# Patient Record
Sex: FEMALE | Race: WHITE | NOT HISPANIC OR LATINO | Employment: OTHER | URBAN - METROPOLITAN AREA
[De-identification: names, ages, dates, MRNs, and addresses within clinical notes are randomized per-mention and may not be internally consistent; named-entity substitution may affect disease eponyms.]

---

## 2023-09-06 ENCOUNTER — OFFICE VISIT (OUTPATIENT)
Dept: UROLOGY | Facility: CLINIC | Age: 83
End: 2023-09-06
Payer: MEDICARE

## 2023-09-06 VITALS
HEART RATE: 65 BPM | SYSTOLIC BLOOD PRESSURE: 124 MMHG | DIASTOLIC BLOOD PRESSURE: 74 MMHG | WEIGHT: 150 LBS | BODY MASS INDEX: 27.6 KG/M2 | HEIGHT: 62 IN | OXYGEN SATURATION: 98 %

## 2023-09-06 DIAGNOSIS — N39.41 URGENCY INCONTINENCE: Primary | ICD-10-CM

## 2023-09-06 RX ORDER — ALLOPURINOL 100 MG/1
TABLET ORAL
COMMUNITY

## 2023-09-06 RX ORDER — RAMIPRIL 5 MG/1
CAPSULE ORAL
COMMUNITY

## 2023-09-06 RX ORDER — CLOBETASOL PROPIONATE 0.5 MG/G
CREAM TOPICAL
COMMUNITY
Start: 2023-08-09

## 2023-09-06 RX ORDER — CONJUGATED ESTROGENS 0.62 MG/G
CREAM VAGINAL
COMMUNITY
Start: 2023-06-19

## 2023-09-06 RX ORDER — THIAMINE HCL 100 MG
TABLET ORAL
COMMUNITY

## 2023-09-06 RX ORDER — TIZANIDINE 2 MG/1
TABLET ORAL
COMMUNITY
Start: 2023-08-09

## 2023-09-06 RX ORDER — APIXABAN 2.5 MG/1
TABLET, FILM COATED ORAL
COMMUNITY

## 2023-09-06 RX ORDER — METHENAMINE HIPPURATE 1 G/1
TABLET ORAL
COMMUNITY

## 2023-09-06 RX ORDER — ONDANSETRON 4 MG/1
TABLET, FILM COATED ORAL
COMMUNITY
Start: 2023-03-14

## 2023-09-06 RX ORDER — BUDESONIDE AND FORMOTEROL FUMARATE DIHYDRATE 80; 4.5 UG/1; UG/1
2 AEROSOL RESPIRATORY (INHALATION)
COMMUNITY
Start: 2023-06-27

## 2023-09-06 RX ORDER — ROSUVASTATIN CALCIUM 10 MG/1
TABLET, COATED ORAL
COMMUNITY

## 2023-09-06 RX ORDER — FAMOTIDINE 20 MG/1
TABLET, FILM COATED ORAL
COMMUNITY
Start: 2023-08-15

## 2023-09-06 RX ORDER — BECLOMETHASONE DIPROPIONATE HFA 80 UG/1
AEROSOL, METERED RESPIRATORY (INHALATION)
COMMUNITY
Start: 2023-05-12

## 2023-09-06 RX ORDER — ISOSORBIDE MONONITRATE 60 MG/1
TABLET, EXTENDED RELEASE ORAL
COMMUNITY

## 2023-09-06 RX ORDER — NITROGLYCERIN 0.4 MG/1
TABLET SUBLINGUAL
COMMUNITY

## 2023-09-06 RX ORDER — LEVOTHYROXINE SODIUM 88 UG/1
TABLET ORAL
COMMUNITY
Start: 2023-07-24

## 2023-09-06 RX ORDER — LEVOTHYROXINE SODIUM 0.07 MG/1
TABLET ORAL
COMMUNITY
Start: 2023-08-11

## 2023-09-06 RX ORDER — FLUOXETINE HYDROCHLORIDE 20 MG/1
CAPSULE ORAL
COMMUNITY
Start: 2023-07-24

## 2023-09-06 RX ORDER — MONTELUKAST SODIUM 10 MG/1
TABLET ORAL
COMMUNITY

## 2023-09-06 NOTE — PATIENT INSTRUCTIONS
Educational Material for Overactive Bladder (OAB)     Overactive bladder is a term that may describe numerous urinary complaints, including urinary urgency, frequency, waking to urinate at night (nocturia), urgency incontinence (leaking of urine if unable to hold bladder), etc.     Overactive bladder symptoms may be more prevalent after menopause related to loss of estrogen and its effects on bladder and urethral integrity.     Conservative Management (Non-Medication Treatment) Options    1) Double voiding (after urinating, stay in bathroom, wait a few minutes, attempt to urinate again), to ensure complete bladder emptying  2) Timed voiding (urinate every 2-3 hours during the day) to prevent urgency related to a full or filling bladder  3) Avoid caffeine, alcohol, dark colten, teas, sugary soft drinks and other potentially bladder irritating beverages   4) Drink plenty of water (at least 5-7 glasses daily), to ensure your urine is dilute and clear (concentrated urine can increase bothersome urinary urgency and frequency as well as bladder pain)     5) Pelvic Floor Physical Therapy - a referral to a pelvic floor physical therapist may be recommended to evaluate and treat potential dysfunction of your pelvic floor (laxity or high pelvic tone) which can exacerbate urinary symptoms      Medical Treatment    Vaginal Estrogen Therapy - Your urologist may have prescribed vaginal estrogen cream, this is typically described to women who are postmenopausal.  After menopause a decline in estrogen can affect the urethra and bladder tissues.  pH imbalances can result in the loss of normal bacteria in the vagina and around the urethra, increasing risk of infections and urinary symptoms.  Restoration of normal blood flow with estrogen treatment and correction of pH imbalances may help to relieve bladder overactivity symptoms.    Anticholinergic Therapy - There are numerous medications within the anticholinergic class which helped  to treat overactive bladder symptoms.  A short list of this medications include solifenacin, tolterodine, trospium, oxybutynin, etc.  These medications work by acting on the junction between bladder nerves and the bladder muscle.  These medications help to relax the bladder muscle tone and prevent sensation of urgency and frequency.  These medications may help you store your urine better.  Common side effects of these medications include dry eye, dry mouth, constipation, possible vision changes, rare but possible mental status changes in the elderly.  If you have been prescribed these medications, monitor for these symptoms.    Beta 3 Agonist Therapy - Medications in this class include Mirabegron (Myrbetriq) and Vibegron (Gemtesa).  These medications are typically prescribed if patients do not tolerate anticholinergics or cannot be prescribed anticholinergics.  These medications are typically more expensive than anticholinergics, but insurance companies may cover all or part of the cost depending on your individual plan.  These medications are just as effective as anticholinergics and act on a different nerve pathway in the bladder and do not have the typical side effects of dry eye, dry mouth, or constipation.  Mirabegron may potentially cause an increase in blood pressure in less than 10% of patients, which will need to be closely monitored during therapy.    Surgical Treatments    Bladder Botox Injections - For patients who fail conservative management or medical therapy, injection of Botox into the bladder muscle is an option for treatment.  This is an outpatient procedure which can usually be done under a very light anesthetic.  Botox injections into the bladder causes a slight paralysis of the bladder muscle and long-term bladder relaxation for resolution of overactive bladder symptoms without need for medication.  The effects of Botox injections may last anywhere from 3 to 12 months depending on the  individual.  There is a slight risk of urinary retention or unable to urinate after procedure requiring the possible need to learn how to self catheterize to empty the bladder, this only occurs in less than 3-5% of patients typically.  Risks of injections include bleeding, infection and urinary retention as described above.  These are typically very well-tolerated.  One of the downsides to this approach is that patients may need repeat outpatient procedures over the long-term for continued management.    Sacral Neuromodulation - Sacral neuromodulation describes a surgical procedure whereby a small electronic nerve stimulator device is placed underneath the skin and fat of the buttocks, connected to an electrical lead placed along the S3 (sacral nerve root #3) which is the nerve that controls bladder function.  It has been shown that low-dose electrical stimulation of the bladder nerve can help to prevent overactive bladder symptoms including urgency, frequency and urgency related incontinence, without the need for taking oral medications.  This also benefits men and women who have fecal incontinence or incontinence of stool.  There are 2 companies, [employer]) and [employer] who make these devices.  Patients have control over their own device programming and stimulation levels with a small hand-held device.  This is an outpatient procedure but usually requires 2 separate procedures to complete placement.  The benefits of this approach includes long-term symptom control without the need for multiple procedures.  The implantable generator/nerve stimulator contains within it a battery which can be recharged with an externally worn device without the need for surgical intervention for battery replacement long-term.

## 2023-09-06 NOTE — PROGRESS NOTES
"     [facility] Female Office Visit      Patient Name: Karin Mchugh  : 1940   MRN: [id number]     Chief Complaint:  Lower Urinary Tract Symptoms.   Chief Complaint   Patient presents with    Urinary Incontinence       Referring Provider: Skye Loya MD    History of Present Illness: Mr. Mchugh is a 82 y.o. female with history lower urinary tract symptoms. She has severe overactive bladder with urgency incontinence. Has previously followed with urology in [place], then transitioned care to  Urology when she moved here in December.     She reports \"\" pad use due to constant sense of urgency incontinence.   She has a history of C diff following with ID.   She wears 8-10 pads per day. She leaks during sleep.     She has had a MI and s/p coronary stent, stage III CKD per outside records, prior PE, on Eliquis, history of atrial fibrillation s/p ablation.     Bowel and bladder symptom score is severe at 26/28.     She has been offered SNS, PTNS, and has undergone prior intravesical botox for UUI. She had to self catheterize after botox remotely. She reports no desire to attempt Botox again due to side effects.     Patient's cardiologist is at .       Subjective      Review of System: Review of Systems   Constitutional:  Positive for fatigue.   Respiratory:  Positive for shortness of breath.    Genitourinary:  Positive for frequency and urgency.   Musculoskeletal:  Positive for back pain, gait problem and joint swelling.   Neurological:  Positive for dizziness and light-headedness.   Psychiatric/Behavioral:  The patient is nervous/anxious.     I have reviewed the ROS documented by my clinical staff, I have updated appropriately and I agree. Boone Rasheed MD    Past Medical History:  No past medical history on file.    Past Surgical History:  No past surgical history on file.    Medications:    Current Outpatient Medications:     allopurinol (ZYLOPRIM) 100 MG tablet, ALLOPURINOL 100 MG TABS, Disp: , " Rfl:     budesonide-formoterol (SYMBICORT) 80-4.5 MCG/ACT inhaler, Inhale 2 puffs., Disp: , Rfl:     Calcium Citrate-Vitamin D3 (CITRACAL) 315-6.25 MG-MCG tablet tablet, EQ CALCIUM CITRATE+D 315-6.25 MG-MCG TABS, Disp: , Rfl:     clobetasol (TEMOVATE) 0.05 % cream, , Disp: , Rfl:     Eliquis 2.5 MG tablet tablet, ELIQUIS 2.5 MG TABS, Disp: , Rfl:     famotidine (PEPCID) 20 MG tablet, , Disp: , Rfl:     FLUoxetine (PROzac) 20 MG capsule, , Disp: , Rfl:     Hiprex 1 g tablet, HIPREX 1 GM TABS, Disp: , Rfl:     isosorbide mononitrate (IMDUR) 60 MG 24 hr tablet, ISOSORBIDE MONONITRATE ER 60 MG XR24H-TAB, Disp: , Rfl:     levothyroxine (SYNTHROID, LEVOTHROID) 75 MCG tablet, , Disp: , Rfl:     levothyroxine (SYNTHROID, LEVOTHROID) 88 MCG tablet, , Disp: , Rfl:     montelukast (SINGULAIR) 10 MG tablet, MONTELUKAST SODIUM 10 MG TABS, Disp: , Rfl:     mupirocin (BACTROBAN) 2 % ointment, , Disp: , Rfl:     nitroglycerin (NITROSTAT) 0.4 MG SL tablet, NITROGLYCERIN 0.4 MG SUBL, Disp: , Rfl:     ondansetron (ZOFRAN) 4 MG tablet, if needed., Disp: , Rfl:     Premarin 0.625 MG/GM vaginal cream, , Disp: , Rfl:     Qvar RediHaler 80 MCG/ACT inhaler, , Disp: , Rfl:     ramipril (ALTACE) 5 MG capsule, RAMIPRIL 5 MG CAPS, Disp: , Rfl:     rosuvastatin (CRESTOR) 10 MG tablet, ROSUVASTATIN CALCIUM 10 MG TABS, Disp: , Rfl:     tiZANidine (ZANAFLEX) 2 MG tablet, , Disp: , Rfl:     Allergies:  Allergies   Allergen Reactions    Sulfa Antibiotics Rash       Social History:  Social History     Socioeconomic History    Marital status:    Tobacco Use    Smoking status: Former     Types: Cigarettes   Vaping Use    Vaping Use: Never used   Substance and Sexual Activity    Alcohol use: Not Currently    Drug use: Never    Sexual activity: Not Currently       Family History:  No family history on file.           Objective     Physical Exam:   Vital Signs:   Vitals:    09/06/23 0835   BP: 124/74   Pulse: 65   SpO2: 98%   Weight: 68 kg (150 lb)  "  Height: 157.5 cm (62\")     Body mass index is 27.44 kg/m².     Physical Exam  Constitutional:       Appearance: Normal appearance.   HENT:      Head: Normocephalic and atraumatic.      Nose: Nose normal.      Mouth/Throat:      Mouth: Mucous membranes are moist.      Pharynx: Oropharynx is clear.   Eyes:      Extraocular Movements: Extraocular movements intact.      Pupils: Pupils are equal, round, and reactive to light.   Pulmonary:      Effort: Pulmonary effort is normal. No respiratory distress.   Musculoskeletal:         General: No swelling or deformity. Normal range of motion.      Cervical back: Normal range of motion and neck supple.   Skin:     General: Skin is warm and dry.   Neurological:      General: No focal deficit present.      Mental Status: She is alert and oriented to person, place, and time. Mental status is at baseline.   Psychiatric:         Mood and Affect: Mood normal.         Behavior: Behavior normal.       Labs:   Brief Urine Lab Results  (Last result in the past 365 days)        Color   Clarity   Blood   Leuk Est   Nitrite   Protein   CREAT   Urine HCG        01/24/23 1215 Yellow   Clear     Moderate                            Lab Results   Component Value Date    CALCIUM 10.3 (H) 08/12/2022     02/25/2022    K 4.1 02/25/2022    CO2 22 02/25/2022     (H) 02/25/2022    BUN 49 (H) 02/25/2022    CREATININE 1.8 (H) 02/25/2022    EGFRIFAFRI 33 (L) 02/25/2022    EGFRIFNONA 27 (L) 02/25/2022    ANIONGAP 10 02/25/2022       Lab Results   Component Value Date    WBC 6.89 07/17/2023    HGB 11.1 (L) 07/17/2023    HCT 34.3 07/17/2023     (H) 07/17/2023     07/17/2023       Images:   X-ray chest PA and lateral    Result Date: 5/23/2023  No acute cardiopulmonary process. Images reviewed, interpreted, and dictated by Dr. Rajiv Moreno. Transcribed by ROYA Dumont (R).      Measures:   Tobacco:   Karin Mchugh  reports that she has quit smoking. Her smoking use included " cigarettes. She does not have any smokeless tobacco history on file.      Assessment / Plan      Assessment:  Mrs. Mchugh is a 82 y.o. female who presented today with lower urinary tract symptoms. She has severe and refractory UUI. She denies ELADIO. Has had prior botox and failed Mirabegron, Oxybutynin, Detrol, and recently Gemtesa.  She is currently on no medications.  She is on Hip-Jimi 1 g twice daily for UTI prevention per reports.  Patient's remaining options are to retrial medications, consider intravesical Botox or consider sacral nerve stimulation, we had a long discussion about these options.  We discussed risk benefits and alternatives.  Given her severe urgency incontinence, she may be best served with sacral nerve stimulation.  She reports being told not to attempt this by her cardiologist and nephrologist, unclear why.  We will obtain clearance to hold Eliquis 48 hours prior to procedure and we discussed this is performed under monitored anesthesia care/light sedation.  This would be performed at the main hospital given her medical comorbidities.  I recommend advanced trial for this patient with tined lead placement.  The patient would like to discuss with her family, I will see her back in 2 weeks to discuss.    Diagnoses and all orders for this visit:    1. Urgency incontinence (Primary)           Follow Up:   Return in about 2 weeks (around 9/20/2023).    I spent approximately 45 minutes providing clinical care for this patient; including review of patient's chart and provider documentation, face to face time spent with patient in examination room (obtaining history, performing physical exam, discussing diagnosis and management options), placing orders, and completing patient documentation.     Boone Rasheed MD  [facility] [place]

## 2023-09-08 ENCOUNTER — PATIENT ROUNDING (BHMG ONLY) (OUTPATIENT)
Dept: UROLOGY | Facility: CLINIC | Age: 83
End: 2023-09-08
Payer: MEDICARE

## 2023-09-08 NOTE — PROGRESS NOTES
A [employer] message has been sent to the patient [place] [facility] [place]

## 2023-09-21 ENCOUNTER — OFFICE VISIT (OUTPATIENT)
Dept: UROLOGY | Facility: CLINIC | Age: 83
End: 2023-09-21
Payer: MEDICARE

## 2023-09-21 VITALS — BODY MASS INDEX: 27.44 KG/M2 | HEIGHT: 62 IN

## 2023-09-21 DIAGNOSIS — N39.41 URGENCY INCONTINENCE: Primary | ICD-10-CM

## 2023-09-21 DIAGNOSIS — N32.81 OVERACTIVE BLADDER: ICD-10-CM

## 2023-09-21 LAB
BILIRUB BLD-MCNC: NEGATIVE MG/DL
CLARITY, POC: CLEAR
COLOR UR: YELLOW
EXPIRATION DATE: NORMAL
GLUCOSE UR STRIP-MCNC: NEGATIVE MG/DL
KETONES UR QL: NEGATIVE
LEUKOCYTE EST, POC: NEGATIVE
Lab: NORMAL
NITRITE UR-MCNC: NEGATIVE MG/ML
PH UR: 6 [PH] (ref 5–8)
PROT UR STRIP-MCNC: NEGATIVE MG/DL
RBC # UR STRIP: NEGATIVE /UL
SP GR UR: 1.01 (ref 1–1.03)
UROBILINOGEN UR QL: NORMAL

## 2023-09-21 NOTE — PROGRESS NOTES
"     Follow Up Office Visit      Patient Name: Karin Mchugh  : 1940   MRN: [id number]     Chief Complaint:    Chief Complaint   Patient presents with    Urgency Incontinence       Referring Provider: No ref. provider found    History of Present Illness: Karin Mchugh is a 83 y.o. female who presents today for follow up of  severe urgency incontinence. She has severe overactive bladder with urgency incontinence. Has previously followed with urology in [place], then transitioned care to  Urology when she moved here in December.      She reports \"\" pad use due to constant sense of urgency incontinence.   She has a history of C diff following with ID.   She wears 8-10 pads per day. She leaks during sleep.      She has had a MI and s/p coronary stent, stage III CKD per outside records, prior PE, on Eliquis, history of atrial fibrillation s/p ablation.      She has been offered SNS, PTNS, and has undergone prior intravesical botox for UUI. She had to self catheterize after botox remotely. She reports no desire to attempt Botox again due to side effects.     Patient returns to clinic today with her son for further discussion regarding treatment options.  She is most interested in proceeding with sacral neuromodulation advanced trial.  She has previously failed oxybutynin, Myrbetriq, Gemtesa, Detrol.    Subjective      Review of System: Review of Systems   Genitourinary:  Positive for frequency and urgency.    I have reviewed the ROS documented by my clinical staff, I have updated appropriately and I agree. Boone Rasheed MD    I have reviewed and the following portions of the patient's history were updated as appropriate: past family history, past medical history, past social history, past surgical history and problem list.    Medications:     Current Outpatient Medications:     allopurinol (ZYLOPRIM) 100 MG tablet, ALLOPURINOL 100 MG TABS, Disp: , Rfl:     budesonide-formoterol (SYMBICORT) 80-4.5 MCG/ACT " inhaler, Inhale 2 puffs., Disp: , Rfl:     Calcium Citrate-Vitamin D3 (CITRACAL) 315-6.25 MG-MCG tablet tablet, EQ CALCIUM CITRATE+D 315-6.25 MG-MCG TABS, Disp: , Rfl:     clobetasol (TEMOVATE) 0.05 % cream, , Disp: , Rfl:     Eliquis 2.5 MG tablet tablet, ELIQUIS 2.5 MG TABS, Disp: , Rfl:     famotidine (PEPCID) 20 MG tablet, , Disp: , Rfl:     FLUoxetine (PROzac) 20 MG capsule, , Disp: , Rfl:     Hiprex 1 g tablet, HIPREX 1 GM TABS, Disp: , Rfl:     isosorbide mononitrate (IMDUR) 60 MG 24 hr tablet, ISOSORBIDE MONONITRATE ER 60 MG XR24H-TAB, Disp: , Rfl:     levothyroxine (SYNTHROID, LEVOTHROID) 75 MCG tablet, , Disp: , Rfl:     levothyroxine (SYNTHROID, LEVOTHROID) 88 MCG tablet, , Disp: , Rfl:     montelukast (SINGULAIR) 10 MG tablet, MONTELUKAST SODIUM 10 MG TABS, Disp: , Rfl:     mupirocin (BACTROBAN) 2 % ointment, , Disp: , Rfl:     nitroglycerin (NITROSTAT) 0.4 MG SL tablet, NITROGLYCERIN 0.4 MG SUBL, Disp: , Rfl:     ondansetron (ZOFRAN) 4 MG tablet, if needed., Disp: , Rfl:     Premarin 0.625 MG/GM vaginal cream, , Disp: , Rfl:     Qvar RediHaler 80 MCG/ACT inhaler, , Disp: , Rfl:     ramipril (ALTACE) 5 MG capsule, RAMIPRIL 5 MG CAPS, Disp: , Rfl:     rosuvastatin (CRESTOR) 10 MG tablet, ROSUVASTATIN CALCIUM 10 MG TABS, Disp: , Rfl:     tiZANidine (ZANAFLEX) 2 MG tablet, , Disp: , Rfl:     Allergies:   Allergies   Allergen Reactions    Sulfa Antibiotics Rash       Bladder & Bowel Symptom Questionnaire    How often do you usually urinate during the day ?   4 - At least once an hour    2.   How many timed do you urinate at night?   2 - About every 2-3 hours    3.   What is the reason that you usually urinate?   3 - About every 1-2 hours   4.   Once you get the urge to go, how long can you     comfortably delay?   3 - About every 1-2 hours   5.   How often do you get a sudden urge that makes you rush to the bathroom?   4 - At least once an hour    6.   How often does a sudden urge to urinate result in you  "leaking urine or wetting pads?   4 - At least once an hour    7.  In your opinion, how good is your bladder control?   3 - About every 1-2 hours   8.  Do you have accidental bowel leakage?   no   9.  Do you have difficulty fully emptying your bladder?   no   10.  Do you experience accidental leakage when performing some physical activity such as coughing, sneezing, laughing or exercise?   yes   11. Have you tried medications to help improve your symptoms?   yes   12. Would you be interested in learning about a long-lasting option that may help you with your symptoms?   yes                                                                             Total Score   23     0-7 (Mild) 8-16 (Moderate) 17-28 (Severe)      Post void residual bladder scan:   95 mL     Objective     Physical Exam:   Vital Signs:   Vitals:    09/21/23 1514   Height: 157.5 cm (62\")     Body mass index is 27.44 kg/m².     Physical Exam  Constitutional:       Appearance: Normal appearance.   HENT:      Head: Normocephalic and atraumatic.      Nose: Nose normal.      Mouth/Throat:      Mouth: Mucous membranes are moist.      Pharynx: Oropharynx is clear.   Eyes:      Extraocular Movements: Extraocular movements intact.      Pupils: Pupils are equal, round, and reactive to light.   Pulmonary:      Effort: Pulmonary effort is normal. No respiratory distress.   Musculoskeletal:         General: No swelling or deformity. Normal range of motion.      Cervical back: Normal range of motion and neck supple.   Skin:     General: Skin is warm and dry.   Neurological:      General: No focal deficit present.      Mental Status: She is alert and oriented to person, place, and time. Mental status is at baseline.   Psychiatric:         Mood and Affect: Mood normal.         Behavior: Behavior normal.       Labs:   Brief Urine Lab Results  (Last result in the past 365 days)        Color   Clarity   Blood   Leuk Est   Nitrite   Protein   CREAT   Urine HCG        " 09/21/23 1529 Yellow   Clear   Negative   Negative   Negative   Negative                        Lab Results   Component Value Date    CALCIUM 10.3 (H) 08/12/2022     02/25/2022    K 4.1 02/25/2022    CO2 22 02/25/2022     (H) 02/25/2022    BUN 49 (H) 02/25/2022    CREATININE 1.8 (H) 02/25/2022    EGFRIFAFRI 33 (L) 02/25/2022    EGFRIFNONA 27 (L) 02/25/2022    ANIONGAP 10 02/25/2022       Lab Results   Component Value Date    WBC 6.89 07/17/2023    HGB 11.1 (L) 07/17/2023    HCT 34.3 07/17/2023     (H) 07/17/2023     07/17/2023       Images:   No Images in the past 120 days found..    Measures:   Tobacco:   Karin Mchugh  reports that she has quit smoking. Her smoking use included cigarettes. She does not have any smokeless tobacco history on file.    Assessment / Plan      Assessment/Plan:   83 y.o. female who presented today for follow up of severe and refractory urgency incontinence/OAB.  She has tried and failed multiple medications including Gemtesa, Myrbetriq, oxybutynin, Detrol.  We will proceed to the operating room for sacral neuromodulation with Axonics, advanced trial with tined lead placement.  Risk this procedure include bleeding, infection, need for device removal, cardiac issues given her history of A-fib, anesthesia related complications including DVT, PE, MI, CVA, death.  She has significant comorbidities and will need to hold her anticoagulation for surgery.  We will reach out to her cardiologist for clearance.      Proceed with advanced axonics trial 11/3/23   [facility] cardiology  for clearance  Hold Eliquis prior     Axonics advanced trial 11/3/23  Second stage 11/10/23        Diagnoses and all orders for this visit:    1. Urgency incontinence (Primary)  -     POC Urinalysis Dipstick, Automated  -     Case Request; Standing  -     CBC (No Diff); Future  -     Basic Metabolic Panel; Future  -     Urinalysis without microscopic (no culture) - Urine, Clean Catch; Future  -      ECG 12 Lead; Future  -     ceFAZolin (ANCEF) 2,000 mg in sodium chloride 0.9 % 100 mL IVPB    2. Overactive bladder  -     Case Request; Standing  -     CBC (No Diff); Future  -     Basic Metabolic Panel; Future  -     Urinalysis without microscopic (no culture) - Urine, Clean Catch; Future  -     ECG 12 Lead; Future  -     ceFAZolin (ANCEF) 2,000 mg in sodium chloride 0.9 % 100 mL IVPB    Other orders  -     Follow Anesthesia Guidelines / Protocol; Future  -     Provide NPO Instructions to Patient; Future  -     Provide Hydration Instructions to Patient; Future  -     Provide Chlorhexidine Skin Prep Wipes and Instructions; Future  -     Obtain Informed Consent; Future  -     Nursing to Order Blood Glucose on all Inpatients >18 years Old with not BMP Ordered; Future  -     Nursing to Place Order for HgbA1c on Adult Patients >18 Years Old (HgbA1c Within One Month of Admission Acceptable); Future  -     Follow Anesthesia Guidelines / Protocol; Standing  -     Verify NPO Status; Standing  -     Verify the Time Patient Completed [place] / G2; Standing  -     Nurse to Contact Surgeon for Cardiology Consult if Indicated; Future  -     Nurse to Consult  Anesthesia if Indicated; Future           Follow Up:   No follow-ups on file.    I spent approximately 30 minutes providing clinical care for this patient; including review of patient's chart and provider documentation, face to face time spent with patient in examination room (obtaining history, performing physical exam, discussing diagnosis and management options), placing orders, and completing patient documentation.     Boone Rasheed MD  [facility] [place]

## 2023-09-23 PROBLEM — N32.81 OVERACTIVE BLADDER: Status: ACTIVE | Noted: 2023-09-23

## 2023-09-23 PROBLEM — N39.41 URGENCY INCONTINENCE: Status: ACTIVE | Noted: 2023-09-23

## 2023-10-27 ENCOUNTER — PRE-ADMISSION TESTING (OUTPATIENT)
Dept: PREADMISSION TESTING | Facility: HOSPITAL | Age: 83
End: 2023-10-27
Payer: MEDICARE

## 2023-10-27 VITALS — HEIGHT: 62 IN | BODY MASS INDEX: 27.79 KG/M2 | WEIGHT: 151.01 LBS

## 2023-10-27 DIAGNOSIS — N39.41 URGENCY INCONTINENCE: ICD-10-CM

## 2023-10-27 DIAGNOSIS — N32.81 OVERACTIVE BLADDER: ICD-10-CM

## 2023-10-27 LAB
ANION GAP SERPL CALCULATED.3IONS-SCNC: 11 MMOL/L (ref 5–15)
BILIRUB UR QL STRIP: NEGATIVE
BUN SERPL-MCNC: 69 MG/DL (ref 8–23)
BUN/CREAT SERPL: 37.9 (ref 7–25)
CALCIUM SPEC-SCNC: 10.2 MG/DL (ref 8.6–10.5)
CHLORIDE SERPL-SCNC: 106 MMOL/L (ref 98–107)
CLARITY UR: ABNORMAL
CO2 SERPL-SCNC: 23 MMOL/L (ref 22–29)
COLOR UR: YELLOW
CREAT SERPL-MCNC: 1.82 MG/DL (ref 0.57–1)
DEPRECATED RDW RBC AUTO: 46.5 FL (ref 37–54)
EGFRCR SERPLBLD CKD-EPI 2021: 27.1 ML/MIN/1.73
ERYTHROCYTE [DISTWIDTH] IN BLOOD BY AUTOMATED COUNT: 12.7 % (ref 12.3–15.4)
GLUCOSE SERPL-MCNC: 117 MG/DL (ref 65–99)
GLUCOSE UR STRIP-MCNC: NEGATIVE MG/DL
HCT VFR BLD AUTO: 34.7 % (ref 34–46.6)
HGB BLD-MCNC: 11.2 G/DL (ref 12–15.9)
HGB UR QL STRIP.AUTO: NEGATIVE
INR PPP: 1.22 (ref 0.89–1.12)
KETONES UR QL STRIP: NEGATIVE
LEUKOCYTE ESTERASE UR QL STRIP.AUTO: NEGATIVE
MCH RBC QN AUTO: 32.5 PG (ref 26.6–33)
MCHC RBC AUTO-ENTMCNC: 32.3 G/DL (ref 31.5–35.7)
MCV RBC AUTO: 100.6 FL (ref 79–97)
NITRITE UR QL STRIP: NEGATIVE
PH UR STRIP.AUTO: 5.5 [PH] (ref 5–8)
PLATELET # BLD AUTO: 186 10*3/MM3 (ref 140–450)
PMV BLD AUTO: 9.5 FL (ref 6–12)
POTASSIUM SERPL-SCNC: 4.2 MMOL/L (ref 3.5–5.2)
PROT UR QL STRIP: NEGATIVE
PROTHROMBIN TIME: 15.5 SECONDS (ref 12.2–14.5)
RBC # BLD AUTO: 3.45 10*6/MM3 (ref 3.77–5.28)
SODIUM SERPL-SCNC: 140 MMOL/L (ref 136–145)
SP GR UR STRIP: 1.01 (ref 1–1.03)
UROBILINOGEN UR QL STRIP: ABNORMAL
WBC NRBC COR # BLD: 5.07 10*3/MM3 (ref 3.4–10.8)

## 2023-10-27 PROCEDURE — 36415 COLL VENOUS BLD VENIPUNCTURE: CPT

## 2023-10-27 PROCEDURE — 85610 PROTHROMBIN TIME: CPT

## 2023-10-27 PROCEDURE — 81003 URINALYSIS AUTO W/O SCOPE: CPT

## 2023-10-27 PROCEDURE — 85027 COMPLETE CBC AUTOMATED: CPT

## 2023-10-27 PROCEDURE — 80048 BASIC METABOLIC PNL TOTAL CA: CPT

## 2023-10-27 NOTE — PAT
Patient did not review general PAT education video as instructed in their preoperative information received from their surgeon.  One-on-one Pre Admission Testing general education provided during PAT visit.  Copies of PAT general education handouts (Incentive Spirometry, Meds to Beds Program, Patient Belongings, Pre-op skin preparation instructions, Blood Glucose testing, Visitor policy, Surgery FAQ, Code H) distributed to patient. Encouraged patient/family to read PAT general education handouts thoroughly and notify PAT staff with any questions or concerns. Patient instructed to bring PAT pass and completed skin prep sheet (if applicable) on the day of procedure. Patient verbalized understanding of all information and priority content.     Patient to apply Chlorhexadine wipes  to surgical area (as instructed) the night before procedure and the AM of procedure. Wipes provided.    Patient instructed to drink 20 ounces of Gatorade or Gatorlyte (if diabetic) and it needs to be completed 1 hour (for Main OR patients) or 2 hours (scheduled  section & [facility] patients) before given arrival time for procedure (NO RED Gatorade and NO Gatorade Zero).    Patient verbalized understanding.    Per Anesthesia Request, patient instructed not to take their ACE/ARB medications on the AM of surgery.    Cardiac clearance on chart from 2023.  Permission to hold Eliquis 3-5 days.

## 2023-11-02 ENCOUNTER — ANESTHESIA EVENT (OUTPATIENT)
Dept: PERIOP | Facility: HOSPITAL | Age: 83
End: 2023-11-02
Payer: MEDICARE

## 2023-11-02 RX ORDER — SODIUM CHLORIDE, SODIUM LACTATE, POTASSIUM CHLORIDE, CALCIUM CHLORIDE 600; 310; 30; 20 MG/100ML; MG/100ML; MG/100ML; MG/100ML
9 INJECTION, SOLUTION INTRAVENOUS CONTINUOUS
Status: CANCELLED | OUTPATIENT
Start: 2023-11-02

## 2023-11-02 RX ORDER — SODIUM CHLORIDE 9 MG/ML
40 INJECTION, SOLUTION INTRAVENOUS AS NEEDED
Status: CANCELLED | OUTPATIENT
Start: 2023-11-02

## 2023-11-02 RX ORDER — FAMOTIDINE 10 MG/ML
20 INJECTION, SOLUTION INTRAVENOUS ONCE
Status: CANCELLED | OUTPATIENT
Start: 2023-11-02 | End: 2023-11-02

## 2023-11-03 ENCOUNTER — PREP FOR SURGERY (OUTPATIENT)
Dept: OTHER | Facility: HOSPITAL | Age: 83
End: 2023-11-03
Payer: MEDICARE

## 2023-11-03 ENCOUNTER — ANESTHESIA (OUTPATIENT)
Dept: PERIOP | Facility: HOSPITAL | Age: 83
End: 2023-11-03
Payer: MEDICARE

## 2023-11-03 ENCOUNTER — APPOINTMENT (OUTPATIENT)
Dept: GENERAL RADIOLOGY | Facility: HOSPITAL | Age: 83
End: 2023-11-03
Payer: MEDICARE

## 2023-11-03 ENCOUNTER — HOSPITAL ENCOUNTER (OUTPATIENT)
Facility: HOSPITAL | Age: 83
Setting detail: HOSPITAL OUTPATIENT SURGERY
Discharge: HOME | End: 2023-11-03
Attending: STUDENT IN AN ORGANIZED HEALTH CARE EDUCATION/TRAINING PROGRAM | Admitting: STUDENT IN AN ORGANIZED HEALTH CARE EDUCATION/TRAINING PROGRAM
Payer: MEDICARE

## 2023-11-03 VITALS
TEMPERATURE: 98.8 F | RESPIRATION RATE: 16 BRPM | OXYGEN SATURATION: 96 % | DIASTOLIC BLOOD PRESSURE: 58 MMHG | SYSTOLIC BLOOD PRESSURE: 151 MMHG | HEART RATE: 77 BPM

## 2023-11-03 DIAGNOSIS — N39.41 URGENCY INCONTINENCE: ICD-10-CM

## 2023-11-03 DIAGNOSIS — N32.81 OVERACTIVE BLADDER: ICD-10-CM

## 2023-11-03 DIAGNOSIS — N39.41 URGENCY INCONTINENCE: Primary | ICD-10-CM

## 2023-11-03 PROCEDURE — C1894 INTRO/SHEATH, NON-LASER: HCPCS | Performed by: STUDENT IN AN ORGANIZED HEALTH CARE EDUCATION/TRAINING PROGRAM

## 2023-11-03 PROCEDURE — 25810000003 LACTATED RINGERS PER 1000 ML: Performed by: NURSE ANESTHETIST, CERTIFIED REGISTERED

## 2023-11-03 PROCEDURE — 76000 FLUOROSCOPY <1 HR PHYS/QHP: CPT | Performed by: STUDENT IN AN ORGANIZED HEALTH CARE EDUCATION/TRAINING PROGRAM

## 2023-11-03 PROCEDURE — 25010000002 PROPOFOL 10 MG/ML EMULSION: Performed by: NURSE ANESTHETIST, CERTIFIED REGISTERED

## 2023-11-03 PROCEDURE — 25010000002 FENTANYL CITRATE (PF) 100 MCG/2ML SOLUTION: Performed by: NURSE ANESTHETIST, CERTIFIED REGISTERED

## 2023-11-03 PROCEDURE — 64590 INS/RPL PRPH SAC/GSTR NPG/R: CPT | Performed by: STUDENT IN AN ORGANIZED HEALTH CARE EDUCATION/TRAINING PROGRAM

## 2023-11-03 PROCEDURE — 76000 FLUOROSCOPY <1 HR PHYS/QHP: CPT

## 2023-11-03 PROCEDURE — 25810000003 SODIUM CHLORIDE 0.9 % SOLUTION: Performed by: ANESTHESIOLOGY

## 2023-11-03 PROCEDURE — C1778 LEAD, NEUROSTIMULATOR: HCPCS | Performed by: STUDENT IN AN ORGANIZED HEALTH CARE EDUCATION/TRAINING PROGRAM

## 2023-11-03 PROCEDURE — 25010000002 DEXAMETHASONE PER 1 MG: Performed by: NURSE ANESTHETIST, CERTIFIED REGISTERED

## 2023-11-03 PROCEDURE — 64561 IMPLANT NEUROELECTRODES: CPT | Performed by: STUDENT IN AN ORGANIZED HEALTH CARE EDUCATION/TRAINING PROGRAM

## 2023-11-03 PROCEDURE — 25010000002 LIDOCAINE 1 % SOLUTION: Performed by: STUDENT IN AN ORGANIZED HEALTH CARE EDUCATION/TRAINING PROGRAM

## 2023-11-03 PROCEDURE — 25010000002 ONDANSETRON PER 1 MG: Performed by: NURSE ANESTHETIST, CERTIFIED REGISTERED

## 2023-11-03 PROCEDURE — C1767 GENERATOR, NEURO NON-RECHARG: HCPCS | Performed by: STUDENT IN AN ORGANIZED HEALTH CARE EDUCATION/TRAINING PROGRAM

## 2023-11-03 DEVICE — TINED LEAD KIT
Type: IMPLANTABLE DEVICE | Site: BACK | Status: FUNCTIONAL
Brand: AXONICS

## 2023-11-03 DEVICE — NEUROSTIMULATOR
Type: IMPLANTABLE DEVICE | Status: FUNCTIONAL
Brand: AXONICS

## 2023-11-03 RX ORDER — PROPOFOL 10 MG/ML
VIAL (ML) INTRAVENOUS AS NEEDED
Status: DISCONTINUED | OUTPATIENT
Start: 2023-11-03 | End: 2023-11-03 | Stop reason: SURG

## 2023-11-03 RX ORDER — MIDAZOLAM HYDROCHLORIDE 1 MG/ML
0.5 INJECTION INTRAMUSCULAR; INTRAVENOUS
Status: DISCONTINUED | OUTPATIENT
Start: 2023-11-03 | End: 2023-11-03 | Stop reason: HOSPADM

## 2023-11-03 RX ORDER — FENTANYL CITRATE 50 UG/ML
50 INJECTION, SOLUTION INTRAMUSCULAR; INTRAVENOUS
Status: DISCONTINUED | OUTPATIENT
Start: 2023-11-03 | End: 2023-11-03 | Stop reason: HOSPADM

## 2023-11-03 RX ORDER — SODIUM CHLORIDE, SODIUM LACTATE, POTASSIUM CHLORIDE, CALCIUM CHLORIDE 600; 310; 30; 20 MG/100ML; MG/100ML; MG/100ML; MG/100ML
INJECTION, SOLUTION INTRAVENOUS CONTINUOUS PRN
Status: DISCONTINUED | OUTPATIENT
Start: 2023-11-03 | End: 2023-11-03 | Stop reason: SURG

## 2023-11-03 RX ORDER — HYDROMORPHONE HYDROCHLORIDE 1 MG/ML
0.5 INJECTION, SOLUTION INTRAMUSCULAR; INTRAVENOUS; SUBCUTANEOUS
Status: DISCONTINUED | OUTPATIENT
Start: 2023-11-03 | End: 2023-11-03 | Stop reason: HOSPADM

## 2023-11-03 RX ORDER — FAMOTIDINE 20 MG/1
20 TABLET, FILM COATED ORAL ONCE
Status: COMPLETED | OUTPATIENT
Start: 2023-11-03 | End: 2023-11-03

## 2023-11-03 RX ORDER — FENTANYL CITRATE 50 UG/ML
INJECTION, SOLUTION INTRAMUSCULAR; INTRAVENOUS AS NEEDED
Status: DISCONTINUED | OUTPATIENT
Start: 2023-11-03 | End: 2023-11-03 | Stop reason: SURG

## 2023-11-03 RX ORDER — LIDOCAINE HYDROCHLORIDE 10 MG/ML
0.5 INJECTION, SOLUTION EPIDURAL; INFILTRATION; INTRACAUDAL; PERINEURAL ONCE AS NEEDED
Status: COMPLETED | OUTPATIENT
Start: 2023-11-03 | End: 2023-11-03

## 2023-11-03 RX ORDER — LIDOCAINE HYDROCHLORIDE 10 MG/ML
INJECTION, SOLUTION EPIDURAL; INFILTRATION; INTRACAUDAL; PERINEURAL AS NEEDED
Status: DISCONTINUED | OUTPATIENT
Start: 2023-11-03 | End: 2023-11-03 | Stop reason: SURG

## 2023-11-03 RX ORDER — SODIUM CHLORIDE 0.9 % (FLUSH) 0.9 %
10 SYRINGE (ML) INJECTION EVERY 12 HOURS SCHEDULED
Status: DISCONTINUED | OUTPATIENT
Start: 2023-11-03 | End: 2023-11-03 | Stop reason: HOSPADM

## 2023-11-03 RX ORDER — SUCCINYLCHOLINE/SOD CL,ISO/PF 200MG/10ML
SYRINGE (ML) INTRAVENOUS AS NEEDED
Status: DISCONTINUED | OUTPATIENT
Start: 2023-11-03 | End: 2023-11-03 | Stop reason: SURG

## 2023-11-03 RX ORDER — LIDOCAINE HYDROCHLORIDE 10 MG/ML
INJECTION, SOLUTION INFILTRATION; PERINEURAL AS NEEDED
Status: DISCONTINUED | OUTPATIENT
Start: 2023-11-03 | End: 2023-11-03 | Stop reason: HOSPADM

## 2023-11-03 RX ORDER — DEXAMETHASONE SODIUM PHOSPHATE 4 MG/ML
INJECTION, SOLUTION INTRA-ARTICULAR; INTRALESIONAL; INTRAMUSCULAR; INTRAVENOUS; SOFT TISSUE AS NEEDED
Status: DISCONTINUED | OUTPATIENT
Start: 2023-11-03 | End: 2023-11-03 | Stop reason: SURG

## 2023-11-03 RX ORDER — SODIUM CHLORIDE 9 MG/ML
9 INJECTION, SOLUTION INTRAVENOUS ONCE
Status: COMPLETED | OUTPATIENT
Start: 2023-11-03 | End: 2023-11-03

## 2023-11-03 RX ORDER — ONDANSETRON 2 MG/ML
INJECTION INTRAMUSCULAR; INTRAVENOUS AS NEEDED
Status: DISCONTINUED | OUTPATIENT
Start: 2023-11-03 | End: 2023-11-03 | Stop reason: SURG

## 2023-11-03 RX ORDER — SODIUM CHLORIDE 0.9 % (FLUSH) 0.9 %
10 SYRINGE (ML) INJECTION AS NEEDED
Status: DISCONTINUED | OUTPATIENT
Start: 2023-11-03 | End: 2023-11-03 | Stop reason: HOSPADM

## 2023-11-03 RX ADMIN — DEXAMETHASONE SODIUM PHOSPHATE 8 MG: 4 INJECTION, SOLUTION INTRAMUSCULAR; INTRAVENOUS at 12:10

## 2023-11-03 RX ADMIN — PROPOFOL 50 MG: 10 INJECTION, EMULSION INTRAVENOUS at 12:41

## 2023-11-03 RX ADMIN — FENTANYL CITRATE 50 MCG: 50 INJECTION, SOLUTION INTRAMUSCULAR; INTRAVENOUS at 12:45

## 2023-11-03 RX ADMIN — FENTANYL CITRATE 50 MCG: 50 INJECTION, SOLUTION INTRAMUSCULAR; INTRAVENOUS at 12:30

## 2023-11-03 RX ADMIN — SODIUM CHLORIDE 9 ML/HR: 9 INJECTION, SOLUTION INTRAVENOUS at 11:20

## 2023-11-03 RX ADMIN — ONDANSETRON 4 MG: 2 INJECTION INTRAMUSCULAR; INTRAVENOUS at 12:10

## 2023-11-03 RX ADMIN — FAMOTIDINE 20 MG: 20 TABLET, FILM COATED ORAL at 11:20

## 2023-11-03 RX ADMIN — Medication 100 MG: at 12:06

## 2023-11-03 RX ADMIN — SODIUM CHLORIDE, POTASSIUM CHLORIDE, SODIUM LACTATE AND CALCIUM CHLORIDE: 600; 310; 30; 20 INJECTION, SOLUTION INTRAVENOUS at 12:04

## 2023-11-03 RX ADMIN — PROPOFOL 150 MG: 10 INJECTION, EMULSION INTRAVENOUS at 12:06

## 2023-11-03 RX ADMIN — LIDOCAINE HYDROCHLORIDE 50 MG: 10 INJECTION, SOLUTION EPIDURAL; INFILTRATION; INTRACAUDAL; PERINEURAL at 12:06

## 2023-11-03 RX ADMIN — LIDOCAINE HYDROCHLORIDE 0.5 ML: 10 INJECTION, SOLUTION EPIDURAL; INFILTRATION; INTRACAUDAL; PERINEURAL at 11:20

## 2023-11-03 NOTE — DISCHARGE INSTRUCTIONS
Axonics advanced trial-  Post-Operative Care/Expectations    Follow these guidelines after your procedure in order to assist with your recovery.    Anesthesia Precautions and Expectations  - Rest for 24 hours after receiving general anesthesia, make sure you have someone at home with you that can monitor you  - Do not operate a vehicle, drink alcohol, or make 'important decisions'/sign legal documentation during the immediate recovery period if you received sedation for your procedure  - You may experience a sore throat, jaw discomfort, or muscle aches related to anesthesia, these symptoms may last a few days    Wound care  Leave the gauze and tape over the wound, even if this becomes mildly saturated with blood, as long as the tape is not coming off of the skin we would leave this in place.  Keep clean and dry.     Activity  - You may resume your normal home activities immediately post-operatively; however, light activity is encouraged for 24 hours to prevent urinary bleeding  - Do not operate a vehicle or drink alcohol if you were prescribed narcotic pain medications     Bathing/Showering  -Do not shower or submerge in bath during trial.,  Only sponge bathing.    Pain  -You may have some lower back pain after this trial period.  Tylenol and ibuprofen are suitable for pain management.       When to call your doctor:   - Pain that is not controlled with oral medications  - Signs of significant infection: Fever 101F, shaking chills, profuse sweating, persistent nausea or vomiting, unable to tolerate food or drink   - Inability to urinate for more than 8 hours post-surgery     Eliquis restart date  Please restart Eliquis on Monday, 11/6/2023  Stop on Thursday (no not take that day)

## 2023-11-03 NOTE — BRIEF OP NOTE
[facility]  Progress Note    Karin Mchugh  11/3/2023    Pre-op Diagnosis:   Urgency incontinence [N39.41]  Overactive bladder [N32.81]       Post-Op Diagnosis Codes:     * Urgency incontinence [N39.41]     * Overactive bladder [N32.81]         Procedure(s):  AXONICS STAGE 1 ADVANCED TRIAL, TINED LEAD PLACEMENT WITH GABRIEL      Surgeon(s):  Boone Rasheed MD    Anesthesia: General    Staff:   Circulator: Gian Lake RN; Zehra Batres RN  Radiology Technologist: Rosalind Fry R.T.(R)  Scrub Person: Serge Preciado  Vendor Representative: Kim Guevara         Estimated Blood Loss: minimal    Urine Voided: * No values recorded between 11/3/2023 12:02 PM and 11/3/2023  1:05 PM *    Specimens:                None          Drains: * No LDAs found *    Findings: Uncomplicated RIGHT sided S3 nerve lead, tined lead, good keren and toe response. External generator placed for trial period. Plan to return to OR Friday 11/10 for second stage implant vs lead removal pending trial response.         Complications: None          Boone Rasheed MD     Date: 11/3/2023  Time: 13:08 EDT

## 2023-11-03 NOTE — ANESTHESIA PROCEDURE NOTES
Airway  Urgency: elective    Date/Time: 11/3/2023 12:07 PM  Airway not difficult    General Information and Staff    Patient location during procedure: OR  CRNA/CAA: Orlin Marie CRNA    Indications and Patient Condition  Indications for airway management: airway protection    Preoxygenated: yes  MILS not maintained throughout  Mask difficulty assessment: 0 - not attempted    Final Airway Details  Final airway type: endotracheal airway      Successful airway: ETT  Cuffed: yes   Successful intubation technique: direct laryngoscopy and RSI  Facilitating devices/methods: intubating stylet and cricoid pressure  Endotracheal tube insertion site: oral  Blade: Gary  Blade size: 2  ETT size (mm): 7.0  Cormack-Lehane Classification: grade I - full view of glottis  Placement verified by: chest auscultation and capnometry   Measured from: lips  ETT/EBT  to lips (cm): 20  Number of attempts at approach: 1  Assessment: lips, teeth, and gum same as pre-op and atraumatic intubation    Additional Comments  Negative epigastric sounds, Breath sound equal bilaterally with symmetric chest rise and fall

## 2023-11-03 NOTE — OP NOTE
INTERSTIM STAGE 1 LEAD PLACEMENT WITH GABRIEL  Procedure Report    Patient Name:  Karin Mchugh  YOB: 1940    Date of Surgery:  11/3/2023     Indications: 83-year-old female with severe overactive bladder and urgency incontinence refractory to medical therapy elects to proceed with advanced trial Axonics, stage I tined lead placement by the external neurostimulator.  Risks discussed.    Pre-op Diagnosis:   Urgency incontinence [N39.41]  Overactive bladder [N32.81]       Post-Op Diagnosis Codes:     * Urgency incontinence [N39.41]     * Overactive bladder [N32.81]    Procedure CPT  86718  15199    Procedure(s):  AXONICS STAGE 1 ADVANCED TRIAL, TINED LEAD PLACEMENT WITH GABRIEL    Staff:  Surgeon(s):  Boone Rasheed MD         Anesthesia: General    Estimated Blood Loss: minimal    Implants:    Implant Name Type Inv. Item Serial No.  Lot No. LRB No. Used Action   KT LD STIM TINED AXONICS W/2/STY STR/CRV - [id number] - [id number] Implant KT LD STIM TINED AXONICS W/2/STY STR/CRV [id number] [employer]  N/A 1 Implanted       Specimen:          None        Findings: Uncomplicated right-sided S3 nerve lead, tined lead placed with good keren and toe response at low excitation <2mA, external generator placed.    Complications: None    Description of Procedure:     Patient was identified, intubated and sedated per anesthesia.  She was placed in the prone position.  Pillows were placed under lower abdomen to flatten the sacrum and under shins to allow the toes to dangle freely.  Antibiotics administered.  Timeout was performed identifying the correct patient procedure.  A ground pad wasplaced on the bottom of the patient's foot and the long test stimulation cable was connected to the ground pad and the external test stimulator. Patient was prepped and draped in usual sterile fashion.    The c-arm was moved into AP position to provide fluoroscopic guidance of the sacrum.  "The medial edges of the foramina were identified and marked. The c-arm was then moved into the lateral position to identify the S3 foramen. Once the needle entry point was determined, local injection of 10 cc of local anesthetic was administered. A foramen needle was placed in the superior, medial aspect of the right S3 foramen and appropriate needle depth was visualized utilizing fluoroscopy. Proper S3 needle location was also confirmed by direct observation of the lifting of the perineum or \"bellowing,\" and plantar flexion of the great toe utilizing the mini-hook patient cable and the external test stimulator.  A left-sided S3 needle was also placed and this was also tested, there appeared to be better response at the right S3 needle.  The left S3 needle was removed.    The foramen needle stylet was removed and a directional guide was placed through the needle using markers on the guide to assure appropriate depth. The foramen needle was removed by sliding over the directional guide. A small incision was made peripherally to the directional guide through the skin. The lead introducer with dilator was placed over the directional guide and utilizing fluoroscopic guidance, the lead introducer was advanced until the radiopaque warren was half-way through the foramen.The dilator was removed along with the directional guide. Using fluoroscopy, the tined lead with the bent stylet was placed through the introducer until electrodes two and three straddled the anterior surface of the sacrum. All four electrodes were tested, observing adequate \"keren\" and plantar flexion of the great toe utilizing the mini hook patient cable and the external test stimulator.    After satisfactory lead positioning was confirmed, the introducer was retracted over the lead under continuous fluoroscopy, deploying the tines into presacral tissue. Retesting of all four electrodes confirming appropriate responses was completed. The potential " internal neurostimulator pocket site was identified below the iliac crest and lateral to the sacrum. Local was administered and an incision was made into the subcutaneous tissue creating a connection site. Blunt dissection was used to create a small pocket with hemostasis achieved.    A tunneling tool with sheath was placed from the lead exit site subcutaneously to the small incised pocket site. The tunneling tool was removed and the lead was fed through the sheath, exiting at the pocket site. The sheath was removed. The lead was cleaned and dried. The lead was inserted into the temporary percutaneous  Extension. The four setscrews were tightened with the torque wrench until audible clicks were heard.    Using the tunneling tool and sheath, a subcutaneous tunnel was created from the pocket site to the upper buttock on the right and exited at a localized site. The percutaneous extension was placed through the sheath, the sheath removed, leaving the extension exiting the site. The connection components were placed into the incised pocket. The incisions and pocket were irrigated with antibiotic solution in sterile water and closed with 3-0 Vicryl subcuticular sutures and 4-0 Monocryl skin sutures. Skin glue and gauze were placed over incisions and secured with transparent dressing.    The percutaneous extension was attached to the external gray twist-lock cable. Gauze was placed under the twist-lock connection with cable strain relief and secured using transparent dressing. The patient was transferred to the PACU in satisfactory condition. Using the external test stimulator, the patient was programmed to the electrode of optimum sensation and provided utilization instructions prior to discharge.    Disposition  Return to clinic on Thursday 11/9 for postop visit, we will plan for stage II procedure Friday, 11/10/2023    Boone Rasheed MD     Date: 11/3/2023  Time: 16:46 EDT

## 2023-11-03 NOTE — ANESTHESIA POSTPROCEDURE EVALUATION
Patient: Karin Mchugh    Procedure Summary       Date: 11/03/23 Room / Location:  [facility] /  [facility]    Anesthesia Start: 1204 Anesthesia Stop: 1311    Procedure: AXONICS STAGE 1 ADVANCED TRIAL, TINED LEAD PLACEMENT WITH GABRIEL Diagnosis:       Urgency incontinence      Overactive bladder      (Urgency incontinence [N39.41])      (Overactive bladder [N32.81])    Surgeons: Boone Rasheed MD Provider: Nnamdi Mo MD    Anesthesia Type: general ASA Status: 3            Anesthesia Type: general    Vitals  Vitals Value Taken Time   BP     Temp     Pulse 70 11/03/23 1310   Resp     SpO2 95 % 11/03/23 1310   Vitals shown include unfiled device data.  /55  T 97F  RR 16  HR 68 SR  SPO2 95% 2L NC      Post Anesthesia Care and Evaluation    Patient location during evaluation: PACU  Patient participation: complete - patient participated  Level of consciousness: awake and alert  Pain management: adequate    Airway patency: patent  Anesthetic complications: No anesthetic complications  PONV Status: none  Cardiovascular status: hemodynamically stable and acceptable  Respiratory status: nonlabored ventilation, acceptable and nasal cannula  Hydration status: acceptable

## 2023-11-03 NOTE — ANESTHESIA PREPROCEDURE EVALUATION
Anesthesia Evaluation     Patient summary reviewed and Nursing notes reviewed   no history of anesthetic complications:   NPO Solid Status: > 8 hours  NPO Liquid Status: > 2 hours           Airway   Mallampati: II  TM distance: >3 FB  Neck ROM: full  No difficulty expected  Dental - normal exam     Pulmonary - normal exam    breath sounds clear to auscultation  (+) asthma,sleep apnea (untreated)  Cardiovascular - normal exam    ECG reviewed  PT is on anticoagulation therapy  Rhythm: regular  Rate: normal    (+) hypertension, CAD, cardiac stents , dysrhythmias (Paroxysmal afib) Atrial Fib    ROS comment: 7/23 Echo:  ·  Left Ventricle: The left ventricle is small. The proximal septum is   thickened, but with no evidence of left ventricular outflow tract (LVOT)   obstruction. The left ventricular systolic function is normal.  The LVEF   as measured by biplane volume is 67%. The diastolic function is normal.   The left ventricular filling pressure is normal.   ·  Right Ventricle: The right ventricle is normal in size. The right   ventricular systolic function is normal.   ·  All cardiac valves were reasonably well visualized with 2D and/or color   flow Doppler and there was no significant valve regurgitation or stenosis   seen.       Neuro/Psych- negative ROS  GI/Hepatic/Renal/Endo    (+) GERD, renal disease- CRI, thyroid problem hypothyroidism    Musculoskeletal     Abdominal    Substance History      OB/GYN          Other   arthritis,                 Anesthesia Plan    ASA 3     general     intravenous induction     Anesthetic plan, risks, benefits, and alternatives have been provided, discussed and informed consent has been obtained with: patient.    Plan discussed with CRNA.    CODE STATUS:

## 2023-11-03 NOTE — H&P
Pre-Op H&P  Karin Mchugh  [id number]  1940      Chief complaint: Urine incontinence       Subjective:  Patient is a 83 y.o.female presents for scheduled surgery by Dr. Rasheed. She anticipates a [facility] STAGE 1 ADVANCED TRIAL, TINED LEAD PLACEMENT WITH GABRIEL today. She has a long history of urgency incontinence. She wears pads all day, 8-10/day. She tried botox in the past and had to self cath after. She has previously failed oxybutynin, Myrbetriq, Gemtesa, Detrol.       Review of Systems:  Constitutional-- No fever, chills or sweats. No fatigue.  CV-- No chest pain, palpitation or syncope. +HTN, HLD, CAD  +cardiac clearance  Resp-- No SOB, cough, hemoptysis. +JENNIE on cpap   Skin--No rashes or lesions      Allergies:   Allergies   Allergen Reactions    Sulfa Antibiotics Rash         Home Meds:  Medications Prior to Admission   Medication Sig Dispense Refill Last Dose    allopurinol (ZYLOPRIM) 100 MG tablet ALLOPURINOL 100 MG TABS   11/3/2023 at 0900    budesonide-formoterol (SYMBICORT) 80-4.5 MCG/ACT inhaler Inhale 2 puffs.   11/3/2023 at 0900    Calcium Citrate-Vitamin D3 (CITRACAL) 315-6.25 MG-MCG tablet tablet EQ CALCIUM CITRATE+D 315-6.25 MG-MCG TABS   11/3/2023 at 0900    famotidine (PEPCID) 20 MG tablet Take 1 tablet by mouth 2 (Two) Times a Day.   11/2/2023 at 2350    FLUoxetine (PROzac) 20 MG capsule Take 1 capsule by mouth Daily.   11/3/2023 at 0900    Hiprex 1 g tablet Take 1 tablet by mouth 2 (Two) Times a Day With Meals.   11/3/2023 at 0900    isosorbide mononitrate (IMDUR) 60 MG 24 hr tablet Take 1 tablet by mouth Daily.   11/3/2023 at 0900    levothyroxine (SYNTHROID, LEVOTHROID) 75 MCG tablet Take 1 tablet by mouth Daily.   11/3/2023 at 0900    montelukast (SINGULAIR) 10 MG tablet Take 1 tablet by mouth Every Night.   11/2/2023 at 2300    ramipril (ALTACE) 5 MG capsule Take 1 capsule by mouth Daily.   Past Week    rosuvastatin (CRESTOR) 10 MG tablet Take 1 tablet by mouth Daily.   11/3/2023  at 0900    tiZANidine (ZANAFLEX) 2 MG tablet Take 1 tablet by mouth Every 6 (Six) Hours As Needed for Muscle Spasms.   2023 at 2300    clobetasol (TEMOVATE) 0.05 % cream        Eliquis 2.5 MG tablet tablet Take 1 tablet by mouth Every 12 (Twelve) Hours. Last dose will be 10/30/2023 prior to surgery   10/31/2023    levothyroxine (SYNTHROID, LEVOTHROID) 88 MCG tablet        mupirocin (BACTROBAN) 2 % ointment        nitroglycerin (NITROSTAT) 0.4 MG SL tablet NITROGLYCERIN 0.4 MG SUBL       ondansetron (ZOFRAN) 4 MG tablet Take 1 tablet by mouth Every 8 (Eight) Hours As Needed for Nausea.   More than a month    Premarin 0.625 MG/GM vaginal cream 3 (Three) Times a Week. Monday, Wednesday, Friday       Qvar RediHaler 80 MCG/ACT inhaler    More than a month         PMH:   Past Medical History:   Diagnosis Date    Arthritis     Asthma     Chronic kidney disease     Stage 3    Coronary artery disease     Disease of thyroid gland     GERD (gastroesophageal reflux disease)     Heart attack     around , 1 stent placed ([place], [place])    History of Clostridium difficile infection     History of transfusion 1966    placeta previa, no reactions ([place])    Hyperlipidemia     Hypertension     Skin cancer     Sleep apnea     can't tolerate CPAP     PSH:    Past Surgical History:   Procedure Laterality Date    BREAST SURGERY Right     cyst removed    CARDIAC CATHETERIZATION      around  1 stent placed after heart attack ([place])     SECTION      COLONOSCOPY      EYE SURGERY Bilateral     cataracts    HYSTERECTOMY      Total, with BSO    SKIN BIOPSY      THYROIDECTOMY, PARTIAL      at age 26    TONSILLECTOMY      TOTAL KNEE ARTHROPLASTY Bilateral        Social History:   Tobacco:   Social History     Tobacco Use   Smoking Status Former    Packs/day: 0.50    Years: 5.00    Additional pack years: 0.00    Total pack years: 2.50    Types: Cigarettes    Quit date:     Years since quittin.8   Smokeless  Tobacco Never      Alcohol:     Social History     Substance and Sexual Activity   Alcohol Use Not Currently    Comment: special occasions         Physical Exam:/83 (BP Location: Right arm, Patient Position: Lying)   Pulse 72   Resp 18   SpO2 99%  T 97.7      General Appearance:    Alert, cooperative, no distress, appears stated age   Head:    Normocephalic, without obvious abnormality, atraumatic   Lungs:     Clear to auscultation bilaterally, respirations unlabored    Heart:   Regular rate and rhythm, S1 and S2 normal    Abdomen:    Soft without tenderness   Extremities:   Extremities normal, atraumatic, no cyanosis or edema   Skin:   Skin color, texture, turgor normal, no rashes or lesions   Neurologic:   Grossly intact     Results Review:     LABS:  Lab Results   Component Value Date    WBC 5.07 10/27/2023    HGB 11.2 (L) 10/27/2023    HCT 34.7 10/27/2023    .6 (H) 10/27/2023     10/27/2023    NEUTROABS 5.04 08/12/2022    NEUTROABS 5.04 08/12/2022    GLUCOSE 117 (H) 10/27/2023    BUN 69 (H) 10/27/2023    CREATININE 1.82 (H) 10/27/2023    EGFRIFNONA 27 (L) 02/25/2022    EGFRIFAFRI 33 (L) 02/25/2022     10/27/2023    K 4.2 10/27/2023     10/27/2023    CO2 23.0 10/27/2023    CALCIUM 10.2 10/27/2023    ALBUMIN 3.5 02/25/2022    AST 10 (L) 02/25/2022    ALT 14 02/25/2022    BILITOT 0.3 02/25/2022       RADIOLOGY:  Imaging Results (Last 72 Hours)       ** No results found for the last 72 hours. **            I reviewed the patient's new clinical results.    Cancer Staging (if applicable)  Cancer Patient: __ yes __no __unknown; If yes, clinical stage T:__ N:__M:__, stage group or __N/A      Impression: Urgency incontinence; overactive bladder       Plan: AXONICS STAGE 1 ADVANCED TRIAL, TINED LEAD PLACEMENT WITH GABRIELFREIDA Restrepo   11/3/2023   11:26 EDT

## 2023-11-09 ENCOUNTER — OFFICE VISIT (OUTPATIENT)
Dept: UROLOGY | Facility: CLINIC | Age: 83
End: 2023-11-09
Payer: MEDICARE

## 2023-11-09 VITALS — BODY MASS INDEX: 27.79 KG/M2 | HEIGHT: 62 IN | WEIGHT: 151 LBS

## 2023-11-09 DIAGNOSIS — N32.81 OVERACTIVE BLADDER: ICD-10-CM

## 2023-11-09 DIAGNOSIS — N39.41 URGENCY INCONTINENCE: Primary | ICD-10-CM

## 2023-11-09 NOTE — PROGRESS NOTES
"     Follow Up Office Visit      Patient Name: Karin Mchugh  : 1940   MRN: [id number]     Chief Complaint:    Chief Complaint   Patient presents with    urgency incontinence    OAB       Referring Provider: No ref. provider found    History of Present Illness: Karin Mchugh is a 83 y.o. female who presents today for follow up of urgency incontinence/OAB.     Relevant urologic history previously noted below:     She has severe overactive bladder with urgency incontinence. Has previously followed with urology in [place], then transitioned care to  Urology when she moved here in December.      She reports \"\" pad use due to constant sense of urgency incontinence.   She has a history of C diff following with ID.   She wears 8-10 pads per day. She leaks during sleep.      She has had a MI and s/p coronary stent, stage III CKD per outside records, prior PE, on Eliquis, history of atrial fibrillation s/p ablation.      She has been offered SNS, PTNS, and has undergone prior intravesical botox for UUI. She had to self catheterize after botox remotely. She reports no desire to attempt Botox again due to side effects.     Interim Summary:  Patient proceeded to OR for [employer] advanced trial. Performed on 11/3/23.  Returns in postoperative follow-up.  Has been working with the [employer] representatives to capture bladder diary information and has undergone a few program changes to maximize effects.  She states she is still leaking.  She has been using multiple pads per day.  Legs from a standing position.  Her frequency of urination has improved.  She does report she is able to hold her bladder for a few more hours that she normally with throughout the day.  For her this is an improvement however she is still bothered by leakage.  She has had a subjective greater than 50% improvement in her frequency and urgency of urination.  Her leakage may be multifactorial      Subjective      Review of System: Review of " Systems   Genitourinary:  Positive for enuresis.      I have reviewed the ROS documented by my clinical staff, I have updated appropriately and I agree. Boone Rasheed MD    I have reviewed and the following portions of the patient's history were updated as appropriate: past family history, past medical history, past social history, past surgical history and problem list.    Medications:     Current Outpatient Medications:     allopurinol (ZYLOPRIM) 100 MG tablet, ALLOPURINOL 100 MG TABS, Disp: , Rfl:     budesonide-formoterol (SYMBICORT) 80-4.5 MCG/ACT inhaler, Inhale 2 puffs., Disp: , Rfl:     Calcium Citrate-Vitamin D3 (CITRACAL) 315-6.25 MG-MCG tablet tablet, EQ CALCIUM CITRATE+D 315-6.25 MG-MCG TABS, Disp: , Rfl:     clobetasol (TEMOVATE) 0.05 % cream, , Disp: , Rfl:     Eliquis 2.5 MG tablet tablet, Take 1 tablet by mouth Every 12 (Twelve) Hours. Last dose will be 10/30/2023 prior to surgery, Disp: , Rfl:     famotidine (PEPCID) 20 MG tablet, Take 1 tablet by mouth 2 (Two) Times a Day., Disp: , Rfl:     FLUoxetine (PROzac) 20 MG capsule, Take 1 capsule by mouth Daily., Disp: , Rfl:     Hiprex 1 g tablet, Take 1 tablet by mouth 2 (Two) Times a Day With Meals., Disp: , Rfl:     isosorbide mononitrate (IMDUR) 60 MG 24 hr tablet, Take 1 tablet by mouth Daily., Disp: , Rfl:     levothyroxine (SYNTHROID, LEVOTHROID) 75 MCG tablet, Take 1 tablet by mouth Daily., Disp: , Rfl:     levothyroxine (SYNTHROID, LEVOTHROID) 88 MCG tablet, , Disp: , Rfl:     montelukast (SINGULAIR) 10 MG tablet, Take 1 tablet by mouth Every Night., Disp: , Rfl:     mupirocin (BACTROBAN) 2 % ointment, , Disp: , Rfl:     nitroglycerin (NITROSTAT) 0.4 MG SL tablet, NITROGLYCERIN 0.4 MG SUBL, Disp: , Rfl:     ondansetron (ZOFRAN) 4 MG tablet, Take 1 tablet by mouth Every 8 (Eight) Hours As Needed for Nausea., Disp: , Rfl:     Premarin 0.625 MG/GM vaginal cream, 3 (Three) Times a Week. Monday, Wednesday, Friday, Disp: , Rfl:     Qvar  "RediHaler 80 MCG/ACT inhaler, , Disp: , Rfl:     ramipril (ALTACE) 5 MG capsule, Take 1 capsule by mouth Daily., Disp: , Rfl:     rosuvastatin (CRESTOR) 10 MG tablet, Take 1 tablet by mouth Daily., Disp: , Rfl:     tiZANidine (ZANAFLEX) 2 MG tablet, Take 1 tablet by mouth Every 6 (Six) Hours As Needed for Muscle Spasms., Disp: , Rfl:     Allergies:   Allergies   Allergen Reactions    Sulfa Antibiotics Rash       Bladder & Bowel Symptom Questionnaire    How often do you usually urinate during the day ?   2 - About every 2-3 hours    2.   How many timed do you urinate at night?   1 - About every 3-4 hours   3.   What is the reason that you usually urinate?   2 - About every 2-3 hours    4.   Once you get the urge to go, how long can you     comfortably delay?   2 - About every 2-3 hours    5.   How often do you get a sudden urge that makes you rush to the bathroom?   4 - At least once an hour    6.   How often does a sudden urge to urinate result in you leaking urine or wetting pads?   4 - At least once an hour    7.  In your opinion, how good is your bladder control?   3 - About every 1-2 hours   8.  Do you have accidental bowel leakage?   no   9.  Do you have difficulty fully emptying your bladder?   yes   10.  Do you experience accidental leakage when performing some physical activity such as coughing, sneezing, laughing or exercise?   yes   11. Have you tried medications to help improve your symptoms?   yes   12. Would you be interested in learning about a long-lasting option that may help you with your symptoms?   yes                                                                             Total Score   18     0-7 (Mild) 8-16 (Moderate) 17-28 (Severe)      Post void residual bladder scan:   150 mL     Objective     Physical Exam:   Vital Signs:   Vitals:    11/09/23 1206   Weight: 68.5 kg (151 lb)   Height: 157.5 cm (62\")     Body mass index is 27.62 kg/m².     Physical Exam  Constitutional:       Appearance: " Normal appearance.   HENT:      Head: Normocephalic and atraumatic.      Nose: Nose normal.      Mouth/Throat:      Mouth: Mucous membranes are moist.      Pharynx: Oropharynx is clear.   Eyes:      Extraocular Movements: Extraocular movements intact.      Pupils: Pupils are equal, round, and reactive to light.   Pulmonary:      Effort: Pulmonary effort is normal. No respiratory distress.   Abdominal:      Comments: Sacral external generator in place, under tape, no bleeding or concerns.    Musculoskeletal:         General: No swelling or deformity. Normal range of motion.      Cervical back: Normal range of motion and neck supple.   Skin:     General: Skin is warm and dry.   Neurological:      General: No focal deficit present.      Mental Status: She is alert and oriented to person, place, and time. Mental status is at baseline.   Psychiatric:         Mood and Affect: Mood normal.         Behavior: Behavior normal.         Labs:   Brief Urine Lab Results  (Last result in the past 365 days)        Color   Clarity   Blood   Leuk Est   Nitrite   Protein   CREAT   Urine HCG        11/09/23 1220 Yellow   Clear   Negative   Negative   Negative   Negative                        Lab Results   Component Value Date    GLUCOSE 117 (H) 10/27/2023    CALCIUM 10.2 10/27/2023     10/27/2023    K 4.2 10/27/2023    CO2 23.0 10/27/2023     10/27/2023    BUN 69 (H) 10/27/2023    CREATININE 1.82 (H) 10/27/2023    EGFRIFAFRI 33 (L) 02/25/2022    EGFRIFNONA 27 (L) 02/25/2022    BCR 37.9 (H) 10/27/2023    ANIONGAP 11.0 10/27/2023       Lab Results   Component Value Date    WBC 5.07 10/27/2023    HGB 11.2 (L) 10/27/2023    HCT 34.7 10/27/2023    .6 (H) 10/27/2023     10/27/2023       Images:   No Images in the past 120 days found..    Measures:   Tobacco:   Karin Mchugh  reports that she quit smoking about 35 years ago. Her smoking use included cigarettes. She has a 2.50 pack-year smoking history. She has  never used smokeless tobacco.     Assessment / Plan      Assessment/Plan:   83 y.o. female who presented today for follow up of urgency incontinence/OAB.  Status post [employer]onics advanced trial with tined lead placement and external neurostimulator.  Returns today. I had a long discussion with the patient and her son.     Has had subjective greater than 50% improvement in frequency however has had some ongoing leakage issues.  It is difficult to assess whether these leakage issues are related to urgency or a primary sphincter insufficiency that may warrant further intervention with minimally invasive Bulkamid long-term.  I had a long discussion with the patient today.  Options moving forward are to take her back to the operating room tomorrow as scheduled and remove her entire tined lead implant and external neurostimulator.  If this were to occur she would likely lose the benefit for improved frequency.  Another option is to proceed with stage II procedure if she believes this has been subjectively helping her.  She is most interested in proceeding with stage II, nonrechargeable battery placement.  We will plan to perform this under anesthesia tomorrow in the operating room.  Risk of the procedure discussed including bleeding, infection, need for device removal, anesthesia related complications, DVT, PE, MI, CVA, death.  She will hold her Eliquis tonight and tomorrow prior to the procedure. Willing to proceed.     If persistent leakage issues postoperatively, we will assess for intrinsic sphincter deficiency that may benefit from Bulkamid injections.    Diagnoses and all orders for this visit:    1. Urgency incontinence (Primary)  -     POC Urinalysis Dipstick, Automated    2. Overactive bladder  -     POC Urinalysis Dipstick, Automated           Follow Up:   No follow-ups on file.    I spent approximately 30 minutes providing clinical care for this patient; including review of patient's chart and provider  documentation, face to face time spent with patient in examination room (obtaining history, performing physical exam, discussing diagnosis and management options), placing orders, and completing patient documentation.     Boone Rasheed MD  [facility] [place]

## 2023-11-09 NOTE — H&P (VIEW-ONLY)
"     Follow Up Office Visit      Patient Name: Karin Mchugh  : 1940   MRN: [id number]     Chief Complaint:    Chief Complaint   Patient presents with    urgency incontinence    OAB       Referring Provider: No ref. provider found    History of Present Illness: Karin Mchugh is a 83 y.o. female who presents today for follow up of urgency incontinence/OAB.     Relevant urologic history previously noted below:     She has severe overactive bladder with urgency incontinence. Has previously followed with urology in [place], then transitioned care to  Urology when she moved here in December.      She reports \"\" pad use due to constant sense of urgency incontinence.   She has a history of C diff following with ID.   She wears 8-10 pads per day. She leaks during sleep.      She has had a MI and s/p coronary stent, stage III CKD per outside records, prior PE, on Eliquis, history of atrial fibrillation s/p ablation.      She has been offered SNS, PTNS, and has undergone prior intravesical botox for UUI. She had to self catheterize after botox remotely. She reports no desire to attempt Botox again due to side effects.     Interim Summary:  Patient proceeded to OR for [employer] advanced trial. Performed on 11/3/23.  Returns in postoperative follow-up.  Has been working with the [employer] representatives to capture bladder diary information and has undergone a few program changes to maximize effects.  She states she is still leaking.  She has been using multiple pads per day.  Legs from a standing position.  Her frequency of urination has improved.  She does report she is able to hold her bladder for a few more hours that she normally with throughout the day.  For her this is an improvement however she is still bothered by leakage.  She has had a subjective greater than 50% improvement in her frequency and urgency of urination.  Her leakage may be multifactorial      Subjective      Review of System: Review of " Systems   Genitourinary:  Positive for enuresis.      I have reviewed the ROS documented by my clinical staff, I have updated appropriately and I agree. Boone Rasheed MD    I have reviewed and the following portions of the patient's history were updated as appropriate: past family history, past medical history, past social history, past surgical history and problem list.    Medications:     Current Outpatient Medications:     allopurinol (ZYLOPRIM) 100 MG tablet, ALLOPURINOL 100 MG TABS, Disp: , Rfl:     budesonide-formoterol (SYMBICORT) 80-4.5 MCG/ACT inhaler, Inhale 2 puffs., Disp: , Rfl:     Calcium Citrate-Vitamin D3 (CITRACAL) 315-6.25 MG-MCG tablet tablet, EQ CALCIUM CITRATE+D 315-6.25 MG-MCG TABS, Disp: , Rfl:     clobetasol (TEMOVATE) 0.05 % cream, , Disp: , Rfl:     Eliquis 2.5 MG tablet tablet, Take 1 tablet by mouth Every 12 (Twelve) Hours. Last dose will be 10/30/2023 prior to surgery, Disp: , Rfl:     famotidine (PEPCID) 20 MG tablet, Take 1 tablet by mouth 2 (Two) Times a Day., Disp: , Rfl:     FLUoxetine (PROzac) 20 MG capsule, Take 1 capsule by mouth Daily., Disp: , Rfl:     Hiprex 1 g tablet, Take 1 tablet by mouth 2 (Two) Times a Day With Meals., Disp: , Rfl:     isosorbide mononitrate (IMDUR) 60 MG 24 hr tablet, Take 1 tablet by mouth Daily., Disp: , Rfl:     levothyroxine (SYNTHROID, LEVOTHROID) 75 MCG tablet, Take 1 tablet by mouth Daily., Disp: , Rfl:     levothyroxine (SYNTHROID, LEVOTHROID) 88 MCG tablet, , Disp: , Rfl:     montelukast (SINGULAIR) 10 MG tablet, Take 1 tablet by mouth Every Night., Disp: , Rfl:     mupirocin (BACTROBAN) 2 % ointment, , Disp: , Rfl:     nitroglycerin (NITROSTAT) 0.4 MG SL tablet, NITROGLYCERIN 0.4 MG SUBL, Disp: , Rfl:     ondansetron (ZOFRAN) 4 MG tablet, Take 1 tablet by mouth Every 8 (Eight) Hours As Needed for Nausea., Disp: , Rfl:     Premarin 0.625 MG/GM vaginal cream, 3 (Three) Times a Week. Monday, Wednesday, Friday, Disp: , Rfl:     Qvar  "RediHaler 80 MCG/ACT inhaler, , Disp: , Rfl:     ramipril (ALTACE) 5 MG capsule, Take 1 capsule by mouth Daily., Disp: , Rfl:     rosuvastatin (CRESTOR) 10 MG tablet, Take 1 tablet by mouth Daily., Disp: , Rfl:     tiZANidine (ZANAFLEX) 2 MG tablet, Take 1 tablet by mouth Every 6 (Six) Hours As Needed for Muscle Spasms., Disp: , Rfl:     Allergies:   Allergies   Allergen Reactions    Sulfa Antibiotics Rash       Bladder & Bowel Symptom Questionnaire    How often do you usually urinate during the day ?   2 - About every 2-3 hours    2.   How many timed do you urinate at night?   1 - About every 3-4 hours   3.   What is the reason that you usually urinate?   2 - About every 2-3 hours    4.   Once you get the urge to go, how long can you     comfortably delay?   2 - About every 2-3 hours    5.   How often do you get a sudden urge that makes you rush to the bathroom?   4 - At least once an hour    6.   How often does a sudden urge to urinate result in you leaking urine or wetting pads?   4 - At least once an hour    7.  In your opinion, how good is your bladder control?   3 - About every 1-2 hours   8.  Do you have accidental bowel leakage?   no   9.  Do you have difficulty fully emptying your bladder?   yes   10.  Do you experience accidental leakage when performing some physical activity such as coughing, sneezing, laughing or exercise?   yes   11. Have you tried medications to help improve your symptoms?   yes   12. Would you be interested in learning about a long-lasting option that may help you with your symptoms?   yes                                                                             Total Score   18     0-7 (Mild) 8-16 (Moderate) 17-28 (Severe)      Post void residual bladder scan:   150 mL     Objective     Physical Exam:   Vital Signs:   Vitals:    11/09/23 1206   Weight: 68.5 kg (151 lb)   Height: 157.5 cm (62\")     Body mass index is 27.62 kg/m².     Physical Exam  Constitutional:       Appearance: " Normal appearance.   HENT:      Head: Normocephalic and atraumatic.      Nose: Nose normal.      Mouth/Throat:      Mouth: Mucous membranes are moist.      Pharynx: Oropharynx is clear.   Eyes:      Extraocular Movements: Extraocular movements intact.      Pupils: Pupils are equal, round, and reactive to light.   Pulmonary:      Effort: Pulmonary effort is normal. No respiratory distress.   Abdominal:      Comments: Sacral external generator in place, under tape, no bleeding or concerns.    Musculoskeletal:         General: No swelling or deformity. Normal range of motion.      Cervical back: Normal range of motion and neck supple.   Skin:     General: Skin is warm and dry.   Neurological:      General: No focal deficit present.      Mental Status: She is alert and oriented to person, place, and time. Mental status is at baseline.   Psychiatric:         Mood and Affect: Mood normal.         Behavior: Behavior normal.         Labs:   Brief Urine Lab Results  (Last result in the past 365 days)        Color   Clarity   Blood   Leuk Est   Nitrite   Protein   CREAT   Urine HCG        11/09/23 1220 Yellow   Clear   Negative   Negative   Negative   Negative                        Lab Results   Component Value Date    GLUCOSE 117 (H) 10/27/2023    CALCIUM 10.2 10/27/2023     10/27/2023    K 4.2 10/27/2023    CO2 23.0 10/27/2023     10/27/2023    BUN 69 (H) 10/27/2023    CREATININE 1.82 (H) 10/27/2023    EGFRIFAFRI 33 (L) 02/25/2022    EGFRIFNONA 27 (L) 02/25/2022    BCR 37.9 (H) 10/27/2023    ANIONGAP 11.0 10/27/2023       Lab Results   Component Value Date    WBC 5.07 10/27/2023    HGB 11.2 (L) 10/27/2023    HCT 34.7 10/27/2023    .6 (H) 10/27/2023     10/27/2023       Images:   No Images in the past 120 days found..    Measures:   Tobacco:   Karin Mchugh  reports that she quit smoking about 35 years ago. Her smoking use included cigarettes. She has a 2.50 pack-year smoking history. She has  never used smokeless tobacco.     Assessment / Plan      Assessment/Plan:   83 y.o. female who presented today for follow up of urgency incontinence/OAB.  Status post [employer]ics advanced trial with tined lead placement and external neurostimulator.  Returns today. I had a long discussion with the patient and her son.     Has had subjective greater than 50% improvement in frequency however has had some ongoing leakage issues.  It is difficult to assess whether these leakage issues are related to urgency or a primary sphincter insufficiency that may warrant further intervention with minimally invasive Bulkamid long-term.  I had a long discussion with the patient today.  Options moving forward are to take her back to the operating room tomorrow as scheduled and remove her entire tined lead implant and external neurostimulator.  If this were to occur she would likely lose the benefit for improved frequency.  Another option is to proceed with stage II procedure if she believes this has been subjectively helping her.  She is most interested in proceeding with stage II, nonrechargeable battery placement.  We will plan to perform this under anesthesia tomorrow in the operating room.  Risk of the procedure discussed including bleeding, infection, need for device removal, anesthesia related complications, DVT, PE, MI, CVA, death.  She will hold her Eliquis tonight and tomorrow prior to the procedure. Willing to proceed.     If persistent leakage issues postoperatively, we will assess for intrinsic sphincter deficiency that may benefit from Bulkamid injections.    Diagnoses and all orders for this visit:    1. Urgency incontinence (Primary)  -     POC Urinalysis Dipstick, Automated    2. Overactive bladder  -     POC Urinalysis Dipstick, Automated           Follow Up:   No follow-ups on file.    I spent approximately 30 minutes providing clinical care for this patient; including review of patient's chart and provider  documentation, face to face time spent with patient in examination room (obtaining history, performing physical exam, discussing diagnosis and management options), placing orders, and completing patient documentation.     Boone Rasheed MD  [facility] [place]

## 2023-11-10 ENCOUNTER — ANESTHESIA EVENT (OUTPATIENT)
Dept: PERIOP | Facility: HOSPITAL | Age: 83
End: 2023-11-10
Payer: MEDICARE

## 2023-11-10 ENCOUNTER — ANESTHESIA (OUTPATIENT)
Dept: PERIOP | Facility: HOSPITAL | Age: 83
End: 2023-11-10
Payer: MEDICARE

## 2023-11-10 ENCOUNTER — HOSPITAL ENCOUNTER (OUTPATIENT)
Facility: HOSPITAL | Age: 83
Setting detail: HOSPITAL OUTPATIENT SURGERY
Discharge: HOME | End: 2023-11-10
Attending: STUDENT IN AN ORGANIZED HEALTH CARE EDUCATION/TRAINING PROGRAM | Admitting: STUDENT IN AN ORGANIZED HEALTH CARE EDUCATION/TRAINING PROGRAM
Payer: MEDICARE

## 2023-11-10 VITALS
DIASTOLIC BLOOD PRESSURE: 56 MMHG | BODY MASS INDEX: 27.79 KG/M2 | OXYGEN SATURATION: 92 % | WEIGHT: 151 LBS | SYSTOLIC BLOOD PRESSURE: 148 MMHG | HEIGHT: 62 IN | TEMPERATURE: 98 F | RESPIRATION RATE: 16 BRPM | HEART RATE: 78 BPM

## 2023-11-10 DIAGNOSIS — N32.81 OVERACTIVE BLADDER: Primary | ICD-10-CM

## 2023-11-10 DIAGNOSIS — N39.41 URGENCY INCONTINENCE: ICD-10-CM

## 2023-11-10 PROCEDURE — 25810000003 LACTATED RINGERS PER 1000 ML: Performed by: ANESTHESIOLOGY

## 2023-11-10 PROCEDURE — 25010000002 PROPOFOL 10 MG/ML EMULSION: Performed by: NURSE ANESTHETIST, CERTIFIED REGISTERED

## 2023-11-10 PROCEDURE — 25010000002 FENTANYL CITRATE (PF) 100 MCG/2ML SOLUTION: Performed by: NURSE ANESTHETIST, CERTIFIED REGISTERED

## 2023-11-10 PROCEDURE — 25010000002 CEFAZOLIN PER 500 MG: Performed by: STUDENT IN AN ORGANIZED HEALTH CARE EDUCATION/TRAINING PROGRAM

## 2023-11-10 PROCEDURE — C1767 GENERATOR, NEURO NON-RECHARG: HCPCS | Performed by: STUDENT IN AN ORGANIZED HEALTH CARE EDUCATION/TRAINING PROGRAM

## 2023-11-10 PROCEDURE — 25810000003 LACTATED RINGERS PER 1000 ML

## 2023-11-10 PROCEDURE — 25010000002 LIDOCAINE 1 % SOLUTION: Performed by: STUDENT IN AN ORGANIZED HEALTH CARE EDUCATION/TRAINING PROGRAM

## 2023-11-10 PROCEDURE — 25010000002 PHENYLEPHRINE 10 MG/ML SOLUTION

## 2023-11-10 PROCEDURE — C1787 PATIENT PROGR, NEUROSTIM: HCPCS | Performed by: STUDENT IN AN ORGANIZED HEALTH CARE EDUCATION/TRAINING PROGRAM

## 2023-11-10 PROCEDURE — 25010000002 DEXAMETHASONE PER 1 MG

## 2023-11-10 PROCEDURE — 64590 INS/RPL PRPH SAC/GSTR NPG/R: CPT | Performed by: STUDENT IN AN ORGANIZED HEALTH CARE EDUCATION/TRAINING PROGRAM

## 2023-11-10 DEVICE — NEUROSTIMULATOR
Type: IMPLANTABLE DEVICE | Site: BACK | Status: FUNCTIONAL
Brand: AXONICS

## 2023-11-10 RX ORDER — SODIUM CHLORIDE, SODIUM LACTATE, POTASSIUM CHLORIDE, CALCIUM CHLORIDE 600; 310; 30; 20 MG/100ML; MG/100ML; MG/100ML; MG/100ML
INJECTION, SOLUTION INTRAVENOUS CONTINUOUS PRN
Status: DISCONTINUED | OUTPATIENT
Start: 2023-11-10 | End: 2023-11-10 | Stop reason: SURG

## 2023-11-10 RX ORDER — FENTANYL CITRATE 50 UG/ML
50 INJECTION, SOLUTION INTRAMUSCULAR; INTRAVENOUS
Status: DISCONTINUED | OUTPATIENT
Start: 2023-11-10 | End: 2023-11-13 | Stop reason: HOSPADM

## 2023-11-10 RX ORDER — SODIUM CHLORIDE, SODIUM LACTATE, POTASSIUM CHLORIDE, CALCIUM CHLORIDE 600; 310; 30; 20 MG/100ML; MG/100ML; MG/100ML; MG/100ML
9 INJECTION, SOLUTION INTRAVENOUS CONTINUOUS
Status: DISCONTINUED | OUTPATIENT
Start: 2023-11-10 | End: 2023-11-13 | Stop reason: HOSPADM

## 2023-11-10 RX ORDER — SODIUM CHLORIDE 0.9 % (FLUSH) 0.9 %
10 SYRINGE (ML) INJECTION EVERY 12 HOURS SCHEDULED
Status: DISCONTINUED | OUTPATIENT
Start: 2023-11-10 | End: 2023-11-10 | Stop reason: HOSPADM

## 2023-11-10 RX ORDER — PROPOFOL 10 MG/ML
VIAL (ML) INTRAVENOUS AS NEEDED
Status: DISCONTINUED | OUTPATIENT
Start: 2023-11-10 | End: 2023-11-10 | Stop reason: SURG

## 2023-11-10 RX ORDER — LIDOCAINE HYDROCHLORIDE 10 MG/ML
INJECTION, SOLUTION EPIDURAL; INFILTRATION; INTRACAUDAL; PERINEURAL AS NEEDED
Status: DISCONTINUED | OUTPATIENT
Start: 2023-11-10 | End: 2023-11-10 | Stop reason: SURG

## 2023-11-10 RX ORDER — SODIUM CHLORIDE 9 MG/ML
40 INJECTION, SOLUTION INTRAVENOUS AS NEEDED
Status: DISCONTINUED | OUTPATIENT
Start: 2023-11-10 | End: 2023-11-10 | Stop reason: HOSPADM

## 2023-11-10 RX ORDER — DEXAMETHASONE SODIUM PHOSPHATE 4 MG/ML
INJECTION, SOLUTION INTRA-ARTICULAR; INTRALESIONAL; INTRAMUSCULAR; INTRAVENOUS; SOFT TISSUE AS NEEDED
Status: DISCONTINUED | OUTPATIENT
Start: 2023-11-10 | End: 2023-11-10 | Stop reason: SURG

## 2023-11-10 RX ORDER — SODIUM CHLORIDE 0.9 % (FLUSH) 0.9 %
10 SYRINGE (ML) INJECTION AS NEEDED
Status: DISCONTINUED | OUTPATIENT
Start: 2023-11-10 | End: 2023-11-10 | Stop reason: HOSPADM

## 2023-11-10 RX ORDER — FAMOTIDINE 20 MG/1
20 TABLET, FILM COATED ORAL ONCE
Status: COMPLETED | OUTPATIENT
Start: 2023-11-10 | End: 2023-11-10

## 2023-11-10 RX ORDER — LIDOCAINE HYDROCHLORIDE 10 MG/ML
INJECTION, SOLUTION INFILTRATION; PERINEURAL AS NEEDED
Status: DISCONTINUED | OUTPATIENT
Start: 2023-11-10 | End: 2023-11-10 | Stop reason: HOSPADM

## 2023-11-10 RX ORDER — LIDOCAINE HYDROCHLORIDE 10 MG/ML
0.5 INJECTION, SOLUTION EPIDURAL; INFILTRATION; INTRACAUDAL; PERINEURAL ONCE AS NEEDED
Status: COMPLETED | OUTPATIENT
Start: 2023-11-10 | End: 2023-11-10

## 2023-11-10 RX ORDER — SUCCINYLCHOLINE/SOD CL,ISO/PF 200MG/10ML
SYRINGE (ML) INTRAVENOUS AS NEEDED
Status: DISCONTINUED | OUTPATIENT
Start: 2023-11-10 | End: 2023-11-10 | Stop reason: SURG

## 2023-11-10 RX ORDER — MIDAZOLAM HYDROCHLORIDE 1 MG/ML
0.5 INJECTION INTRAMUSCULAR; INTRAVENOUS
Status: DISCONTINUED | OUTPATIENT
Start: 2023-11-10 | End: 2023-11-10 | Stop reason: HOSPADM

## 2023-11-10 RX ORDER — ONDANSETRON 2 MG/ML
4 INJECTION INTRAMUSCULAR; INTRAVENOUS ONCE AS NEEDED
Status: DISCONTINUED | OUTPATIENT
Start: 2023-11-10 | End: 2023-11-13 | Stop reason: HOSPADM

## 2023-11-10 RX ORDER — ROCURONIUM BROMIDE 10 MG/ML
INJECTION, SOLUTION INTRAVENOUS AS NEEDED
Status: DISCONTINUED | OUTPATIENT
Start: 2023-11-10 | End: 2023-11-10 | Stop reason: SURG

## 2023-11-10 RX ORDER — TRAMADOL HYDROCHLORIDE 50 MG/1
50 TABLET ORAL EVERY 12 HOURS PRN
Qty: 8 TABLET | Refills: 0 | Status: SHIPPED | OUTPATIENT
Start: 2023-11-10

## 2023-11-10 RX ORDER — FENTANYL CITRATE 50 UG/ML
INJECTION, SOLUTION INTRAMUSCULAR; INTRAVENOUS AS NEEDED
Status: DISCONTINUED | OUTPATIENT
Start: 2023-11-10 | End: 2023-11-10 | Stop reason: SURG

## 2023-11-10 RX ORDER — PHENYLEPHRINE HYDROCHLORIDE 10 MG/ML
INJECTION INTRAVENOUS AS NEEDED
Status: DISCONTINUED | OUTPATIENT
Start: 2023-11-10 | End: 2023-11-10 | Stop reason: SURG

## 2023-11-10 RX ORDER — FAMOTIDINE 10 MG/ML
20 INJECTION, SOLUTION INTRAVENOUS ONCE
Status: DISCONTINUED | OUTPATIENT
Start: 2023-11-10 | End: 2023-11-10 | Stop reason: HOSPADM

## 2023-11-10 RX ADMIN — PROPOFOL 20 MG: 10 INJECTION, EMULSION INTRAVENOUS at 15:27

## 2023-11-10 RX ADMIN — LIDOCAINE HYDROCHLORIDE 0.5 ML: 10 INJECTION, SOLUTION EPIDURAL; INFILTRATION; INTRACAUDAL; PERINEURAL at 14:42

## 2023-11-10 RX ADMIN — PROPOFOL 20 MG: 10 INJECTION, EMULSION INTRAVENOUS at 15:30

## 2023-11-10 RX ADMIN — FENTANYL CITRATE 100 MCG: 50 INJECTION, SOLUTION INTRAMUSCULAR; INTRAVENOUS at 14:55

## 2023-11-10 RX ADMIN — SODIUM CHLORIDE, POTASSIUM CHLORIDE, SODIUM LACTATE AND CALCIUM CHLORIDE: 600; 310; 30; 20 INJECTION, SOLUTION INTRAVENOUS at 14:52

## 2023-11-10 RX ADMIN — PROPOFOL 200 MG: 10 INJECTION, EMULSION INTRAVENOUS at 14:55

## 2023-11-10 RX ADMIN — ROCURONIUM BROMIDE 5 MG: 10 SOLUTION INTRAVENOUS at 14:55

## 2023-11-10 RX ADMIN — SODIUM CHLORIDE, POTASSIUM CHLORIDE, SODIUM LACTATE AND CALCIUM CHLORIDE 9 ML/HR: 600; 310; 30; 20 INJECTION, SOLUTION INTRAVENOUS at 14:42

## 2023-11-10 RX ADMIN — LIDOCAINE HYDROCHLORIDE 50 MG: 10 INJECTION, SOLUTION EPIDURAL; INFILTRATION; INTRACAUDAL; PERINEURAL at 14:55

## 2023-11-10 RX ADMIN — PROPOFOL 20 MG: 10 INJECTION, EMULSION INTRAVENOUS at 15:22

## 2023-11-10 RX ADMIN — Medication 100 MG: at 14:55

## 2023-11-10 RX ADMIN — PHENYLEPHRINE HYDROCHLORIDE 50 MCG: 10 INJECTION INTRAVENOUS at 15:28

## 2023-11-10 RX ADMIN — DEXAMETHASONE SODIUM PHOSPHATE 4 MG: 4 INJECTION, SOLUTION INTRAMUSCULAR; INTRAVENOUS at 15:11

## 2023-11-10 RX ADMIN — SODIUM CHLORIDE 2000 MG: 900 INJECTION INTRAVENOUS at 15:01

## 2023-11-10 RX ADMIN — FAMOTIDINE 20 MG: 20 TABLET, FILM COATED ORAL at 14:42

## 2023-11-10 NOTE — ANESTHESIA POSTPROCEDURE EVALUATION
Patient: Karin Mchugh    Procedure Summary       Date: 11/10/23 Room / Location:  [facility]  /  [facility]    Anesthesia Start: 1451 Anesthesia Stop: 1549    Procedure: AXONICS STAGE 2 IMPLANTABLE GENERATOR PLACEMENT (Back) Diagnosis:       Urgency incontinence      (Urgency incontinence [N39.41])    Surgeons: Boone Rasheed MD Provider: Eliud Croft MD    Anesthesia Type: general ASA Status: 3            Anesthesia Type: general    Vitals  Vitals Value Taken Time   /58 11/10/23 1545   Temp     Pulse 83 11/10/23 1547   Resp     SpO2 92 % 11/10/23 1547   Vitals shown include unfiled device data.        Post Anesthesia Care and Evaluation    Patient location during evaluation: PACU  Patient participation: complete - patient participated  Level of consciousness: awake and alert  Pain score: 0  Pain management: adequate    Airway patency: patent  Anesthetic complications: No anesthetic complications  PONV Status: none  Cardiovascular status: hemodynamically stable and acceptable  Respiratory status: nonlabored ventilation, acceptable and nasal cannula  Hydration status: acceptable

## 2023-11-10 NOTE — BRIEF OP NOTE
INTERSTIM STAGE 2 IMPLANTABLE GENERATOR PLACEMENT  Progress Note    Karin Mchugh  11/10/2023    Pre-op Diagnosis:   Urgency incontinence [N39.41]       Post-Op Diagnosis Codes:     * Urgency incontinence [N39.41]           Procedure(s):  AXONICS STAGE 2 IMPLANTABLE GENERATOR PLACEMENT        Surgeon(s):  Boone Rasheed MD    Anesthesia: General    Staff:   Circulator: Darrion Segundo RN; Michelle Philip RN  Scrub Person: Kimberly Blanc; Mae Mayfield         Estimated Blood Loss: minimal    Urine Voided: * No values recorded between 11/10/2023  2:51 PM and 11/10/2023  3:32 PM *    Specimens:                None          Drains: * No LDAs found *    Findings: Uncomplicated placement of non-rechargable [employer]ics neurostimulator. Right buttocks. Copious irrigation with antibiotic, skin barrier Ioban applied prior to incision.         Complications: None          Boone Rasheed MD     Date: 11/10/2023  Time: 15:38 EST

## 2023-11-10 NOTE — ANESTHESIA PROCEDURE NOTES
Airway  Urgency: elective    Date/Time: 11/10/2023 2:59 PM  Airway not difficult    General Information and Staff    Patient location during procedure: OR  CRNA/CAA: Bobby Dennis CRNA    Indications and Patient Condition  Indications for airway management: airway protection    Preoxygenated: yes  MILS not maintained throughout  Mask difficulty assessment: 1 - vent by mask    Final Airway Details  Final airway type: endotracheal airway      Successful airway: ETT  Cuffed: yes   Successful intubation technique: direct laryngoscopy  Endotracheal tube insertion site: oral  Blade: Cara  Blade size: 3  ETT size (mm): 7.0  Cormack-Lehane Classification: grade I - full view of glottis  Placement verified by: chest auscultation and capnometry   Measured from: lips  ETT/EBT  to lips (cm): 20  Number of attempts at approach: 1  Assessment: lips, teeth, and gum same as pre-op and atraumatic intubation    Additional Comments  Negative epigastric sounds, Breath sound equal bilaterally with symmetric chest rise and fall

## 2023-11-10 NOTE — ANESTHESIA PREPROCEDURE EVALUATION
Anesthesia Evaluation     Patient summary reviewed and Nursing notes reviewed   NPO Solid Status: > 8 hours  NPO Liquid Status: > 8 hours           Airway   Mallampati: II  TM distance: >3 FB  Neck ROM: limited  No difficulty expected  Dental - normal exam     Pulmonary    (+) ,decreased breath sounds  Cardiovascular   Exercise tolerance: good (4-7 METS)    Rhythm: regular  Rate: normal        Neuro/Psych  GI/Hepatic/Renal/Endo      Musculoskeletal     Abdominal    Substance History      OB/GYN          Other                          Anesthesia Plan    ASA 3     general     intravenous induction     Anesthetic plan, risks, benefits, and alternatives have been provided, discussed and informed consent has been obtained with: patient.        CODE STATUS:

## 2023-11-10 NOTE — DISCHARGE INSTRUCTIONS
POST OPERATIVE CARE - AXONICS SACRAL NEUROSTIMULATOR PLACEMENT     Anesthesia Precautions and Expectations  Rest for 24 hours after receiving general anesthesia, make sure you have someone at home with you that can monitor you.  Do not operate a vehicle, drink alcohol, or make 'important decisions'/sign legal documentation during the immediate recovery period.  You may experience a sore throat, jaw discomfort, or muscle aches related to anesthesia, these symptoms may last a few days.      Activity Level  It is important that you continue to walk multiple times every day when you return home.  This will help prevent blood clots in legs.      Showering  It is okay to shower once you return home.  Let soapy water wash over your incisions, but do not directly scrub your incisions as this can remove the skin glue overlying your incisions which is necessary for healing.  Do not submerge in a bath, hot tub or swimming pool for at least 3 weeks after surgery.    ELIQUIS  - OK TO RESUME ELIQUIS ON MONDAY 11/13/23

## 2023-11-10 NOTE — INTERVAL H&P NOTE
H&P reviewed. The patient was examined and there are no changes to the H&P.      Boone Rasheed MD

## 2023-11-11 NOTE — OP NOTE
RIA STAGE 2 IMPLANTABLE GENERATOR PLACEMENT  Procedure Report    Patient Name:  Karin Mchugh  YOB: 1940    Date of Surgery:  11/10/2023     Indications: 83-year-old female with significant urgency incontinence/OAB who underwent a [employer] advanced trial, stage I last week.  She has had subjective improvement in urinary frequency and elects to proceed for stage II generator placement.  Risk discussed.  Has held her Eliquis appropriately.    Pre-op Diagnosis:   Urgency incontinence [N39.41]       Post-Op Diagnosis Codes:     * Urgency incontinence [N39.41]      Procedure(s):  AXONICS STAGE 2 IMPLANTABLE GENERATOR PLACEMENT    Staff:  Surgeon(s):  Boone Rasheed MD         Anesthesia: General    Estimated Blood Loss: minimal    Implants:    Implant Name Type Inv. Item Serial No.  Lot No. LRB No. Used Action   NEUROSTM SACRAL/NERV AXONICS NONRECHG W/TORQ WRENCH - [id number] Implant NEUROSTM SACRAL/NERV [employer]ONICS NONRECHG W/TORQ WRENCH  [employer] [id number] N/A 1 Implanted       Specimen:          None        Findings: Uncomplicated placement of nonrechargeable [employer]ics battery/neurostimulator at the right buttock.  External temporary generator removed.    Complications: None    Description of Procedure:     The patient was identified in the preop area, informed consent was reviewed and signed.  She was transported back to the OR and placed supine.  Anesthesia was administered.  She was intubated and sedated.  She was then placed in the prone position.  The pelvis was bumped with pillows to flatten the sacrum.  Patient was prepped and draped in the usual sterile fashion with the percutaneous extension exit site covered.  The buttock incision site was covered with Ioban barrier.  Local injection of lidocaine was administered.     The previous upper right buttock incision was opened using blunt dissection and the lead/percutaneous connection was identified  and evaluated.  Using the torque wrench, the screws were loosened and the percutaneous extension was disconnected from the lead. The percutaneous extension was cut on the wire and was removed from the field ensuring sterility. The subcutaneous pocket was enlarged to accommodate the internal neurostimulator (INS) and hemostasis was established.  The wound was irrigated with Ancef solution in addition to iodine solution around the skin edges.    The lead was cleaned and dried. The lead was inserted into the header of the [employer] neurostimulator until the white tip was visualized at the distal window. The single set screw was tightened using the torque wrench until audible click was heard. The neurostimulator was placed into the pocket with the etched identification side placed upwards and any excessive lead was placed around the neurostimulator. The clinician  impedances were confirmed to be within normal limits, greater than 50 and less than 4,000 ohms. The wound was closed with 3-0 Vicryl deep dermal interrupted sutures and 4-0 Monocryl running subcuticular skin sutures. Counts were correct. Adhesive strips and gauze were placed over the incision and covered with transparent dressing. Estimated blood loss was 5 cc.    Disposition  Patient will return to my clinic in 3 to 4 weeks for postop visit.  She will undergo device programming with [employer] representative and recovery.      Boone Rasheed MD     Date: 11/11/2023  Time: 09:52 EST

## 2023-11-13 ENCOUNTER — TELEPHONE (OUTPATIENT)
Dept: UROLOGY | Facility: CLINIC | Age: 83
End: 2023-11-13
Payer: MEDICARE

## 2023-11-13 NOTE — TELEPHONE ENCOUNTER
----- Message from Boone Rasheed MD sent at 11/10/2023  3:43 PM EST -----  Regarding: post op f/u  S/p axonics today      F/u 1 month in clinic for post op    Boone Rasheed MD

## 2023-11-15 NOTE — ANESTHESIA POSTPROCEDURE EVALUATION
Patient: Karin Mchugh    Procedure Summary       Date: 11/10/23 [facility] / [facility]:  [facility]    Anesthesia Start: 1451 Anesthesia Stop: 1549    Procedure: AXONICS STAGE 2 IMPLANTABLE GENERATOR PLACEMENT (Back) Diagnosis:       Urgency incontinence      (Urgency incontinence [N39.41])    Surgeons: Boone Rasheed MD Provider: Eliud Croft MD    Anesthesia Type: general ASA Status: 3            Anesthesia Type: general    Vitals  Vitals Value Taken Time   /56 11/10/23 1700   Temp 98 °F (36.7 °C) 11/10/23 1645   Pulse 78 11/10/23 1700   Resp 16 11/10/23 1645   SpO2 92 % 11/10/23 1700           Post Anesthesia Care and Evaluation    Patient location during evaluation: PACU  Patient participation: complete - patient participated  Level of consciousness: awake  Pain score: 0  Pain management: adequate    Airway patency: patent  Anesthetic complications: No anesthetic complications  PONV Status: none  Cardiovascular status: acceptable  Respiratory status: acceptable  Hydration status: balanced

## 2023-12-07 ENCOUNTER — TELEPHONE (OUTPATIENT)
Dept: UROLOGY | Facility: CLINIC | Age: 83
End: 2023-12-07
Payer: MEDICARE

## 2023-12-07 NOTE — TELEPHONE ENCOUNTER
Caller: JORGE ([facility] OFFICE)    Relationship: PROVIDER    Best call back number: [phone]    What is the best time to reach you: NORMAL OFFICE HOURS    Who are you requesting to speak with (clinical staff, provider,  specific staff member): DR OR STAFF    Do you know the name of the person who called:  OFFICE CALLED REGARDING PATIENT    What was the call regarding: PATIENT STATED THAT STIMULATOR THAT WAS INSERTED SEEMS TO BE MAKING HER HAVE MORE FREQUENCY OF URINATION. LOOKING TO SEE IF ANY RECOMMENDATIONS OR IF CAN MOVE APPT SOONER TO SEE FOR POST OP.

## 2023-12-07 NOTE — TELEPHONE ENCOUNTER
Patient having frequency, since getting her axonics stimulator, more than before.  Informed patient I would have jeff from [employer] contact pt to see if they can change setting on stimulator to see if frequency improves.  Patient voiced understanding.

## 2024-01-04 ENCOUNTER — OFFICE VISIT (OUTPATIENT)
Dept: UROLOGY | Facility: CLINIC | Age: 84
End: 2024-01-04
Payer: MEDICARE

## 2024-01-04 VITALS
DIASTOLIC BLOOD PRESSURE: 74 MMHG | HEIGHT: 62 IN | BODY MASS INDEX: 27.23 KG/M2 | OXYGEN SATURATION: 99 % | HEART RATE: 82 BPM | SYSTOLIC BLOOD PRESSURE: 146 MMHG | WEIGHT: 148 LBS

## 2024-01-04 DIAGNOSIS — N32.81 OVERACTIVE BLADDER: Primary | ICD-10-CM

## 2024-01-04 DIAGNOSIS — N39.41 URGENCY INCONTINENCE: ICD-10-CM

## 2024-01-04 PROCEDURE — 1159F MED LIST DOCD IN RCRD: CPT | Performed by: STUDENT IN AN ORGANIZED HEALTH CARE EDUCATION/TRAINING PROGRAM

## 2024-01-04 PROCEDURE — 1160F RVW MEDS BY RX/DR IN RCRD: CPT | Performed by: STUDENT IN AN ORGANIZED HEALTH CARE EDUCATION/TRAINING PROGRAM

## 2024-01-04 PROCEDURE — 99213 OFFICE O/P EST LOW 20 MIN: CPT | Performed by: STUDENT IN AN ORGANIZED HEALTH CARE EDUCATION/TRAINING PROGRAM

## 2024-01-04 PROCEDURE — 51798 US URINE CAPACITY MEASURE: CPT | Performed by: STUDENT IN AN ORGANIZED HEALTH CARE EDUCATION/TRAINING PROGRAM

## 2024-01-04 NOTE — PROGRESS NOTES
Follow Up Office Visit      Patient Name: Karin Mchugh  : 1940   MRN: [id number]     Chief Complaint:    Chief Complaint   Patient presents with    Urgency Incontinence    OAB       Referring Provider: No ref. provider found    History of Present Illness: Karin Mchugh is a 83 y.o. female who presents today for first post op visit.      Status post uncomplicated stage II Axonics generator implant on 11/10/2023.      She had underwent a successful Axonics stage I trial the week prior.  She had history of significant urgency incontinence/OAB.  She was using 8-10 pads per day.  She was leaking during sleep.  She had failed multiple medication trials including oxybutynin, Myrbetriq, Gemtesa, Detrol.  Symptoms have persisted even after prior intravesical Botox.  She had a greater than 50% improvement in frequency with the trial that was still having some ongoing leakage issues.  She originally had follow-up scheduled with us 3 to 4 weeks after stage II, however due to illness she had to reschedule.      She states that since stage II, she did not notice any significant improvement initially.  She called the [employer] rep who made an adjustment.  She states since that time her symptoms have worsened.  She states that she is now going through 10-12 pads per day.  Leakage unchanged from before.  She denies any problems with her wound problem denies any issues with fevers, chills, dysuria, flank pain, gross hematuria.  Bowel and bladder symptom questionnaire 22 today.  23 pre-operatively.  She is unable to provide a urine today.    Subjective      Review of System: Review of Systems   Genitourinary:  Positive for frequency and urgency.      I have reviewed the ROS documented by my clinical staff, I have updated appropriately and I agree. Boone Rasheed MD    I have reviewed and the following portions of the patient's history were updated as appropriate: past family history, past medical history, past  social history, past surgical history and problem list.    Medications:     Current Outpatient Medications:     allopurinol (ZYLOPRIM) 100 MG tablet, ALLOPURINOL 100 MG TABS, Disp: , Rfl:     budesonide-formoterol (SYMBICORT) 80-4.5 MCG/ACT inhaler, Inhale 2 puffs., Disp: , Rfl:     Calcium Citrate-Vitamin D3 (CITRACAL) 315-6.25 MG-MCG tablet tablet, EQ CALCIUM CITRATE+D 315-6.25 MG-MCG TABS, Disp: , Rfl:     clobetasol (TEMOVATE) 0.05 % cream, , Disp: , Rfl:     Eliquis 2.5 MG tablet tablet, Take 1 tablet by mouth Every 12 (Twelve) Hours. Last dose will be 10/30/2023 prior to surgery, Disp: , Rfl:     famotidine (PEPCID) 20 MG tablet, Take 1 tablet by mouth 2 (Two) Times a Day., Disp: , Rfl:     FLUoxetine (PROzac) 20 MG capsule, Take 1 capsule by mouth Daily., Disp: , Rfl:     Hiprex 1 g tablet, Take 1 tablet by mouth 2 (Two) Times a Day With Meals., Disp: , Rfl:     isosorbide mononitrate (IMDUR) 60 MG 24 hr tablet, Take 1 tablet by mouth Daily., Disp: , Rfl:     levothyroxine (SYNTHROID, LEVOTHROID) 75 MCG tablet, Take 1 tablet by mouth Daily., Disp: , Rfl:     levothyroxine (SYNTHROID, LEVOTHROID) 88 MCG tablet, , Disp: , Rfl:     montelukast (SINGULAIR) 10 MG tablet, Take 1 tablet by mouth Every Night., Disp: , Rfl:     mupirocin (BACTROBAN) 2 % ointment, , Disp: , Rfl:     nitroglycerin (NITROSTAT) 0.4 MG SL tablet, NITROGLYCERIN 0.4 MG SUBL, Disp: , Rfl:     ondansetron (ZOFRAN) 4 MG tablet, Take 1 tablet by mouth Every 8 (Eight) Hours As Needed for Nausea., Disp: , Rfl:     Premarin 0.625 MG/GM vaginal cream, 3 (Three) Times a Week. Monday, Wednesday, Friday, Disp: , Rfl:     Qvar RediHaler 80 MCG/ACT inhaler, , Disp: , Rfl:     ramipril (ALTACE) 5 MG capsule, Take 1 capsule by mouth Daily., Disp: , Rfl:     rosuvastatin (CRESTOR) 10 MG tablet, Take 1 tablet by mouth Daily., Disp: , Rfl:     tiZANidine (ZANAFLEX) 2 MG tablet, Take 1 tablet by mouth Every 6 (Six) Hours As Needed for Muscle Spasms., Disp: ,  "Rfl:     traMADol (Ultram) 50 MG tablet, Take 1 tablet by mouth Every 12 (Twelve) Hours As Needed for Severe Pain., Disp: 8 tablet, Rfl: 0    Allergies:   Allergies   Allergen Reactions    Sulfa Antibiotics Rash     Bladder & Bowel Symptom Questionnaire    How often do you usually urinate during the day ?   4 - At least once an hour    2.   How many timed do you urinate at night?   3 - About every 1-2 hours   3.   What is the reason that you usually urinate?   3 - About every 1-2 hours   4.   Once you get the urge to go, how long can you     comfortably delay?   4 - At least once an hour    5.   How often do you get a sudden urge that makes you rush to the bathroom?   4 - At least once an hour    6.   How often does a sudden urge to urinate result in you leaking urine or wetting pads?   3 - About every 1-2 hours   7.  In your opinion, how good is your bladder control?   3 - About every 1-2 hours   8.  Do you have accidental bowel leakage?   no   9.  Do you have difficulty fully emptying your bladder?   yes   10.  Do you experience accidental leakage when performing some physical activity such as coughing, sneezing, laughing or exercise?   yes   11. Have you tried medications to help improve your symptoms?   yes   12. Would you be interested in learning about a long-lasting option that may help you with your symptoms?   no                                                                             Total Score   22     0-7 (Mild) 8-16 (Moderate) 17-28 (Severe)        Post void residual bladder scan:   144 mL     Objective     Physical Exam:   Vital Signs:   Vitals:    01/04/24 1354   BP: 146/74   Pulse: 82   SpO2: 99%   Weight: 67.1 kg (148 lb)   Height: 157.5 cm (62\")   PainSc: 0-No pain     Body mass index is 27.07 kg/m².     Physical Exam  Constitutional:       Appearance: Normal appearance.   HENT:      Head: Normocephalic and atraumatic.   Musculoskeletal:         General: Normal range of motion.   Skin:     " General: Skin is warm and dry.      Comments: Right buttocks incision well-healed.   Neurological:      Mental Status: She is alert and oriented to person, place, and time.   Psychiatric:         Mood and Affect: Mood normal.         Behavior: Behavior normal.         Thought Content: Thought content normal.         Judgment: Judgment normal.         Labs:   Brief Urine Lab Results  (Last result in the past 365 days)        Color   Clarity   Blood   Leuk Est   Nitrite   Protein   CREAT   Urine HCG        11/09/23 1220 Yellow   Clear   Negative   Negative   Negative   Negative                        Lab Results   Component Value Date    GLUCOSE 117 (H) 10/27/2023    CALCIUM 10.2 10/27/2023     10/27/2023    K 4.2 10/27/2023    CO2 23.0 10/27/2023     10/27/2023    BUN 69 (H) 10/27/2023    CREATININE 1.82 (H) 10/27/2023    EGFRIFAFRI 33 (L) 02/25/2022    EGFRIFNONA 27 (L) 02/25/2022    BCR 37.9 (H) 10/27/2023    ANIONGAP 11.0 10/27/2023       Lab Results   Component Value Date    WBC 5.07 10/27/2023    HGB 11.2 (L) 10/27/2023    HCT 34.7 10/27/2023    .6 (H) 10/27/2023     10/27/2023       Images:   No Images in the past 120 days found..    Measures:   Tobacco:   Karin Mchugh  reports that she quit smoking about 36 years ago. Her smoking use included cigarettes. She has a 2.50 pack-year smoking history. She has never used smokeless tobacco..     Assessment / Plan      Assessment/Plan:   83 y.o. female who presented today for first postoperative visit after [employer] stage II generator placement.  Preoperatively, she had significant urgency incontinence/OAB and had failed multiple medications as well as intravesical Botox.  She has not seen improvement in her symptoms.  Actually they have worsened some since device was adjusted last month.  Bowel and bladder symptom questionnaire 22 today versus 23 pre-operatively.  She is going through more pads than before.  Symptomatology most consistent  with urge incontinence/OAB.  Mild stress component.  We did verify she is getting good stimulation from the device.  Unfortunately [employer] rep's are away this week on company training.  Will plan to have her come back next week to go over new settings with her [employer] rep.  We discussed her findings after that visit and arrange for follow-up afterwards.  She and her son are happy with this plan.    If no improvement, consideration can be made for repeat intravesical botox injections in clinic.     Diagnoses and all orders for this visit:    1. Overactive bladder (Primary)    2. Urgency incontinence           Follow Up:   Return in about 6 days (around 1/10/2024) for 1/10/23 MA visit, need [employer] rep present  for reprogramming.    I spent approximately 20 minutes providing clinical care for this patient; including review of patient's chart and provider documentation, face to face time spent with patient in examination room (obtaining history, performing physical exam, discussing diagnosis and management options), placing orders, and completing patient documentation.     Boone Rasheed MD  [facility]

## 2024-01-10 ENCOUNTER — CLINICAL SUPPORT (OUTPATIENT)
Dept: UROLOGY | Facility: CLINIC | Age: 84
End: 2024-01-10
Payer: MEDICARE

## 2024-02-21 ENCOUNTER — TELEPHONE (OUTPATIENT)
Dept: UROLOGY | Facility: CLINIC | Age: 84
End: 2024-02-21
Payer: MEDICARE

## 2024-02-21 NOTE — TELEPHONE ENCOUNTER
Caller: KARYNA AT DR. [facility] RE[facility]'S OFFICE     Relationship: PCP    Best call back number: [phone]    What was the call regarding: PATIENT HAD A BLADDER SIMULATOR FOR URINARY INCONTINENCE. OFFICE WANTING TO KNOW IF THERE ARE ANY OTHER SUGGESTIONS FOR PT BECAUSE SHE IS UNABLE TO LEAVE HER HOUSE DUE TO HER URINARY INCONTINENCE.

## 2024-02-21 NOTE — TELEPHONE ENCOUNTER
Please advise.  Thank you.    Besides new setting for [employer] stimulator, any other recommendation.

## 2024-02-21 NOTE — TELEPHONE ENCOUNTER
"Relay     \"Book an appointment for PT\" Left VM for PT to call and schedule an appointment for U/U                 "

## 2024-02-21 NOTE — TELEPHONE ENCOUNTER
Contacted  KARYNA AT DR. JUSTIN REID'S OFFICE   and informed her of the following per Dr. Rasheed.    If patient is miserablenow, pt can consider an indewelling waterman catheter for hygiene.  Dr. Rasheed can talk to pt in clinic at a follow up appointment.  We call pt and LVM asking pt to call us in order to schedule a follow up appt.

## 2024-02-22 ENCOUNTER — TELEPHONE (OUTPATIENT)
Dept: UROLOGY | Facility: CLINIC | Age: 84
End: 2024-02-22
Payer: MEDICARE

## 2024-02-22 NOTE — TELEPHONE ENCOUNTER
Wasn't able to leave .   PT can consider an indwelling waterman catheter for hygiene. Per Dr Rasheed can come in for F/U to discuss.

## 2024-03-14 ENCOUNTER — OFFICE VISIT (OUTPATIENT)
Dept: UROLOGY | Facility: CLINIC | Age: 84
End: 2024-03-14
Payer: MEDICARE

## 2024-03-14 VITALS
BODY MASS INDEX: 27.16 KG/M2 | DIASTOLIC BLOOD PRESSURE: 52 MMHG | HEIGHT: 62 IN | WEIGHT: 147.6 LBS | HEART RATE: 67 BPM | OXYGEN SATURATION: 95 % | SYSTOLIC BLOOD PRESSURE: 126 MMHG

## 2024-03-14 DIAGNOSIS — N39.41 URGENCY INCONTINENCE: Primary | ICD-10-CM

## 2024-03-14 RX ORDER — FUROSEMIDE 20 MG/1
20 TABLET ORAL DAILY
COMMUNITY
Start: 2023-12-12

## 2024-03-14 RX ORDER — VALSARTAN 80 MG/1
80 TABLET ORAL DAILY
COMMUNITY
Start: 2024-02-22 | End: 2025-02-21

## 2024-03-14 NOTE — PROGRESS NOTES
Follow Up Office Visit      Patient Name: Karin Mchugh  : 1940   MRN: [id number]     Chief Complaint:    Chief Complaint   Patient presents with    Urgency incontinence    Overavtive Bladder       Referring Provider: No ref. provider found    History of Present Illness: Karin Mchugh is a 83 y.o. female who presents today for follow up of urgency incontinence.  Patient has refractory OAB.  She has failed numerous beta 3 agonists, anticholinergics, previous intravesical Botox and she is now status post Axonics stage 1+ stage II full implant.  Despite Axonics neurostimulator implantation she reports no change in her urgency incontinence.  She denies stress incontinence.  She reports using 4-7 pads per day for leakage.  She reports ongoing bother related to her urgency incontinence.      Subjective      Review of System: Review of Systems   Genitourinary: Negative.       I have reviewed the ROS documented by my clinical staff, I have updated appropriately and I agree. Boone Rasheed MD    I have reviewed and the following portions of the patient's history were updated as appropriate: past family history, past medical history, past social history, past surgical history and problem list.    Medications:     Current Outpatient Medications:     allopurinol (ZYLOPRIM) 100 MG tablet, ALLOPURINOL 100 MG TABS, Disp: , Rfl:     budesonide-formoterol (SYMBICORT) 80-4.5 MCG/ACT inhaler, Inhale 2 puffs., Disp: , Rfl:     Calcium Citrate-Vitamin D3 (CITRACAL) 315-6.25 MG-MCG tablet tablet, EQ CALCIUM CITRATE+D 315-6.25 MG-MCG TABS, Disp: , Rfl:     clobetasol (TEMOVATE) 0.05 % cream, , Disp: , Rfl:     Eliquis 2.5 MG tablet tablet, Take 1 tablet by mouth Every 12 (Twelve) Hours. Last dose will be 10/30/2023 prior to surgery, Disp: , Rfl:     famotidine (PEPCID) 20 MG tablet, Take 1 tablet by mouth 2 (Two) Times a Day., Disp: , Rfl:     FLUoxetine (PROzac) 20 MG capsule, Take 1 capsule by mouth Daily., Disp: ,  Rfl:     furosemide (LASIX) 20 MG tablet, Take 1 tablet by mouth Daily., Disp: , Rfl:     Hiprex 1 g tablet, Take 1 tablet by mouth 2 (Two) Times a Day With Meals., Disp: , Rfl:     isosorbide mononitrate (IMDUR) 60 MG 24 hr tablet, Take 1 tablet by mouth Daily., Disp: , Rfl:     levothyroxine (SYNTHROID, LEVOTHROID) 75 MCG tablet, Take 1 tablet by mouth Daily., Disp: , Rfl:     levothyroxine (SYNTHROID, LEVOTHROID) 88 MCG tablet, , Disp: , Rfl:     montelukast (SINGULAIR) 10 MG tablet, Take 1 tablet by mouth Every Night., Disp: , Rfl:     mupirocin (BACTROBAN) 2 % ointment, , Disp: , Rfl:     nitroglycerin (NITROSTAT) 0.4 MG SL tablet, NITROGLYCERIN 0.4 MG SUBL, Disp: , Rfl:     ondansetron (ZOFRAN) 4 MG tablet, Take 1 tablet by mouth Every 8 (Eight) Hours As Needed for Nausea., Disp: , Rfl:     Premarin 0.625 MG/GM vaginal cream, 3 (Three) Times a Week. Monday, Wednesday, Friday, Disp: , Rfl:     Qvar RediHaler 80 MCG/ACT inhaler, , Disp: , Rfl:     rosuvastatin (CRESTOR) 10 MG tablet, Take 1 tablet by mouth Daily., Disp: , Rfl:     tiZANidine (ZANAFLEX) 2 MG tablet, Take 1 tablet by mouth Every 6 (Six) Hours As Needed for Muscle Spasms., Disp: , Rfl:     traMADol (Ultram) 50 MG tablet, Take 1 tablet by mouth Every 12 (Twelve) Hours As Needed for Severe Pain., Disp: 8 tablet, Rfl: 0    valsartan (DIOVAN) 80 MG tablet, Take 1 tablet by mouth Daily., Disp: , Rfl:     Vibegron 75 MG tablet, Take 1 tablet by mouth Daily., Disp: 28 tablet, Rfl: 0    Allergies:   Allergies   Allergen Reactions    Sulfa Antibiotics Rash       Bladder & Bowel Symptom Questionnaire    How often do you usually urinate during the day ?   3 - About every 1-2 hours   2.   How many timed do you urinate at night?   1 - 2 times at night   3.   What is the reason that you usually urinate?   3 - Severe urge (can delay less than 10 min)   4.   Once you get the urge to go, how long can you     comfortably delay?   3 - Less than 10 min   5.   How  "often do you get a sudden urge that makes you rush to the bathroom?   3 - A few times a week   6.   How often does a sudden urge to urinate result in you leaking urine or wetting pads?   4 - At least once a day   7.  In your opinion, how good is your bladder control?   3 - Poor   8.  Do you have accidental bowel leakage?   no   9.  Do you have difficulty fully emptying your bladder?   yes   10.  Do you experience accidental leakage when performing some physical activity such as coughing, sneezing, laughing or exercise?   yes   11. Have you tried medications to help improve your symptoms?   yes   12. Would you be interested in learning about a long-lasting option that may help you with your symptoms?   no                                                                             Total Score   20     0-7 (Mild) 8-16 (Moderate) 17-28 (Severe)          Objective     Physical Exam:   Vital Signs:   Vitals:    03/14/24 1142   BP: 126/52   Pulse: 67   SpO2: 95%   Weight: 67 kg (147 lb 9.6 oz)   Height: 157.5 cm (62\")     Body mass index is 27 kg/m².     Physical Exam  Constitutional:       Appearance: Normal appearance.   HENT:      Head: Normocephalic and atraumatic.      Nose: Nose normal.      Mouth/Throat:      Mouth: Mucous membranes are moist.      Pharynx: Oropharynx is clear.   Eyes:      Extraocular Movements: Extraocular movements intact.      Pupils: Pupils are equal, round, and reactive to light.   Pulmonary:      Effort: Pulmonary effort is normal. No respiratory distress.   Musculoskeletal:         General: No swelling or deformity. Normal range of motion.      Cervical back: Normal range of motion and neck supple.   Skin:     General: Skin is warm and dry.   Neurological:      General: No focal deficit present.      Mental Status: She is alert and oriented to person, place, and time. Mental status is at baseline.   Psychiatric:         Mood and Affect: Mood normal.         Behavior: Behavior normal. "         Labs:   Brief Urine Lab Results  (Last result in the past 365 days)        Color   Clarity   Blood   Leuk Est   Nitrite   Protein   CREAT   Urine HCG        11/09/23 1220 Yellow   Clear   Negative   Negative   Negative   Negative                        Lab Results   Component Value Date    GLUCOSE 117 (H) 10/27/2023    CALCIUM 10.2 10/27/2023     10/27/2023    K 4.2 10/27/2023    CO2 23.0 10/27/2023     10/27/2023    BUN 69 (H) 10/27/2023    CREATININE 1.82 (H) 10/27/2023    EGFRIFAFRI 33 (L) 02/25/2022    EGFRIFNONA 27 (L) 02/25/2022    BCR 37.9 (H) 10/27/2023    ANIONGAP 11.0 10/27/2023       Lab Results   Component Value Date    WBC 7.70 02/20/2024    HGB 11.6 02/20/2024    HCT 36.0 02/20/2024    MCV 99 (H) 02/20/2024     02/20/2024       Images:   No Images in the past 120 days found..    Measures:   Tobacco:   Karin Mchugh  reports that she quit smoking about 36 years ago. Her smoking use included cigarettes. She started smoking about 41 years ago. She has a 2.5 pack-year smoking history. She has never used smokeless tobacco.     Assessment / Plan      Assessment/Plan:   83 y.o. female who presented today for follow up of refractory urgency incontinence despite multiple and multimodal therapy including multiple medications, previous intravesical Botox, now sacral neuromodulation.  She is bothered by ongoing urgency leakage.  Unfortunately there is nothing further I can operative the patient other than repeat medication trial or repeat intravesical Botox.  She is less interested in Botox.  I will retrial the patient on Gemtesa and bring her back in 1 month to review symptoms, she has tried Gemtesa and now believes that she may have experienced an improvement on the medication.  We will have the [email or web address] representative at her next visit to go over program and make sure she is getting appropriate stimulation.  She has made multiple programming changes with the [employer] representatives  over the phone.  Patient reports understanding.  Risk of Gemtesa discussed.    Diagnoses and all orders for this visit:    1. Urgency incontinence (Primary)  -     Vibegron 75 MG tablet; Take 1 tablet by mouth Daily.  Dispense: 28 tablet; Refill: 0           Follow Up:   Return in about 4 weeks (around 4/11/2024).    I spent approximately 20 minutes providing clinical care for this patient; including review of patient's chart and provider documentation, face to face time spent with patient in examination room (obtaining history, performing physical exam, discussing diagnosis and management options), placing orders, and completing patient documentation.     Boone Rasheed MD  [facility]

## 2024-04-10 ENCOUNTER — TELEPHONE (OUTPATIENT)
Dept: UROLOGY | Facility: CLINIC | Age: 84
End: 2024-04-10
Payer: MEDICARE

## 2024-04-10 NOTE — TELEPHONE ENCOUNTER
Caller: JORGE    Relationship:PCP OFFICE    Callback number: [phone] PT NUMBER   Is it ok to leave a message: [] Yes [x] No    Requested medication for samples: GEMTESA    How much medication does the patient currently have left: NONE    Who will be picking up the samples: PT ?    Do you need information about patient financial assistance for this medication: [] Yes [x] No    Additional details provided: PT WAS AT PCP AND ASKED THEM TO CALL AND REQUEST SAMPLES. THANK YOU

## 2024-04-12 ENCOUNTER — CLINICAL SUPPORT (OUTPATIENT)
Dept: UROLOGY | Facility: CLINIC | Age: 84
End: 2024-04-12
Payer: MEDICARE

## 2024-04-25 NOTE — PROGRESS NOTES
Pt presented in office to  Gemtesa samples per Dr. Rasheed.   Pt was given 28 days of Gemtesa per Jeb Katz MA.    ==  MA note reviewed.     Boone Rasheed MD

## 2024-05-02 ENCOUNTER — OFFICE VISIT (OUTPATIENT)
Dept: UROLOGY | Facility: CLINIC | Age: 84
End: 2024-05-02
Payer: MEDICARE

## 2024-05-02 VITALS
HEIGHT: 62 IN | BODY MASS INDEX: 26.54 KG/M2 | HEART RATE: 69 BPM | DIASTOLIC BLOOD PRESSURE: 55 MMHG | OXYGEN SATURATION: 95 % | WEIGHT: 144.2 LBS | SYSTOLIC BLOOD PRESSURE: 97 MMHG

## 2024-05-02 DIAGNOSIS — N39.41 URGENCY INCONTINENCE: ICD-10-CM

## 2024-05-02 DIAGNOSIS — N32.81 OAB (OVERACTIVE BLADDER): Primary | ICD-10-CM

## 2024-05-02 RX ORDER — RAMIPRIL 5 MG/1
5 CAPSULE ORAL DAILY
COMMUNITY

## 2024-05-02 NOTE — PROGRESS NOTES
Follow Up Office Visit      Patient Name: Karin Mchugh  : 1940   MRN: [id number]     Chief Complaint:    Chief Complaint   Patient presents with    Urgency incontinence       Referring Provider: No ref. provider found    History of Present Illness: Karin Mchugh is a 83 y.o. female who presents today for follow up of refractory OAB and urgency incontinence.  Complex history and has been previously seen at  urology, she is status post failed PTNS, intravesical Botox, prior beta 3 agonist and anticholinergics, she completed stage I and stage II full implant Axonics in November.  Despite Axonics implantation reported no significant improvement in her urgency.  She was using significant pads 4 to 7/day with ongoing urgency leakage.  This was a severe bothersome quality-of-life issue for the patient.  At last visit we retrial Gemtesa she returns today for evaluation.  After 1 month of Gemtesa states her urinary symptoms are largely resolved.  She has minimal leakage and is using minimal pads.  She is extremely pleased.  Her concern is that she cannot afford Gemtesa based on Medicare coverage.  She is also on Hip-Jimi ordered by an outside provider in the [facility] for recurrent UTI which she continues to take.  No UTI symptoms.  Also using Premarin vaginal cream.    Subjective      Review of System: Review of Systems   Genitourinary: Negative.       I have reviewed the ROS documented by my clinical staff, I have updated appropriately and I agree. Boone Rasheed MD    I have reviewed and the following portions of the patient's history were updated as appropriate: past family history, past medical history, past social history, past surgical history and problem list.    Medications:     Current Outpatient Medications:     allopurinol (ZYLOPRIM) 100 MG tablet, ALLOPURINOL 100 MG TABS, Disp: , Rfl:     budesonide-formoterol (SYMBICORT) 80-4.5 MCG/ACT inhaler, Inhale 2 puffs., Disp: , Rfl:     Calcium  Citrate-Vitamin D3 (CITRACAL) 315-6.25 MG-MCG tablet tablet, EQ CALCIUM CITRATE+D 315-6.25 MG-MCG TABS, Disp: , Rfl:     clobetasol (TEMOVATE) 0.05 % cream, , Disp: , Rfl:     Eliquis 2.5 MG tablet tablet, Take 1 tablet by mouth Every 12 (Twelve) Hours. Last dose will be 10/30/2023 prior to surgery, Disp: , Rfl:     famotidine (PEPCID) 20 MG tablet, Take 1 tablet by mouth 2 (Two) Times a Day., Disp: , Rfl:     FLUoxetine (PROzac) 20 MG capsule, Take 1 capsule by mouth Daily., Disp: , Rfl:     furosemide (LASIX) 20 MG tablet, Take 1 tablet by mouth Daily., Disp: , Rfl:     Hiprex 1 g tablet, Take 1 tablet by mouth 2 (Two) Times a Day With Meals., Disp: , Rfl:     isosorbide mononitrate (IMDUR) 60 MG 24 hr tablet, Take 1 tablet by mouth Daily., Disp: , Rfl:     levothyroxine (SYNTHROID, LEVOTHROID) 88 MCG tablet, , Disp: , Rfl:     montelukast (SINGULAIR) 10 MG tablet, Take 1 tablet by mouth Every Night., Disp: , Rfl:     mupirocin (BACTROBAN) 2 % ointment, , Disp: , Rfl:     nitroglycerin (NITROSTAT) 0.4 MG SL tablet, NITROGLYCERIN 0.4 MG SUBL, Disp: , Rfl:     ondansetron (ZOFRAN) 4 MG tablet, Take 1 tablet by mouth Every 8 (Eight) Hours As Needed for Nausea., Disp: , Rfl:     Premarin 0.625 MG/GM vaginal cream, 3 (Three) Times a Week. Monday, Wednesday, Friday, Disp: , Rfl:     Qvar RediHaler 80 MCG/ACT inhaler, , Disp: , Rfl:     ramipril (ALTACE) 5 MG capsule, Take 1 capsule by mouth Daily. Pt cannot remember the dosage, Disp: , Rfl:     rosuvastatin (CRESTOR) 10 MG tablet, Take 1 tablet by mouth Daily., Disp: , Rfl:     tiZANidine (ZANAFLEX) 2 MG tablet, Take 1 tablet by mouth Every 6 (Six) Hours As Needed for Muscle Spasms., Disp: , Rfl:     traMADol (Ultram) 50 MG tablet, Take 1 tablet by mouth Every 12 (Twelve) Hours As Needed for Severe Pain., Disp: 8 tablet, Rfl: 0    levothyroxine (SYNTHROID, LEVOTHROID) 75 MCG tablet, Take 1 tablet by mouth Daily. (Patient not taking: Reported on 5/2/2024), Disp: , Rfl:  "    valsartan (DIOVAN) 80 MG tablet, Take 1 tablet by mouth Daily. (Patient not taking: Reported on 5/2/2024), Disp: , Rfl:     Vibegron 75 MG tablet, Take 1 tablet by mouth Daily., Disp: 42 tablet, Rfl: 0    Allergies:   Allergies   Allergen Reactions    Sulfa Antibiotics Rash       Bladder & Bowel Symptom Questionnaire    How often do you usually urinate during the day ?   1 - About every 3-4 hours   2.   How many timed do you urinate at night?   0 - 0-1 time at night   3.   What is the reason that you usually urinate?   2 - Moderate urge (can delay 10-60 min)   4.   Once you get the urge to go, how long can you     comfortably delay?   2 - 10-30 min   5.   How often do you get a sudden urge that makes you rush to the bathroom?   1 - Rarely   6.   How often does a sudden urge to urinate result in you leaking urine or wetting pads?   2 - A few times a month   7.  In your opinion, how good is your bladder control?   2 - Good   8.  Do you have accidental bowel leakage?   no   9.  Do you have difficulty fully emptying your bladder?   yes   10.  Do you experience accidental leakage when performing some physical activity such as coughing, sneezing, laughing or exercise?   yes   11. Have you tried medications to help improve your symptoms?   yes   12. Would you be interested in learning about a long-lasting option that may help you with your symptoms?   yes                                                                             Total Score   10     0-7 (Mild) 8-16 (Moderate) 17-28 (Severe)         Objective     Physical Exam:   Vital Signs:   Vitals:    05/02/24 1141   BP: 97/55   Pulse: 69   SpO2: 95%   Weight: 65.4 kg (144 lb 3.2 oz)   Height: 157.5 cm (62\")     Body mass index is 26.37 kg/m².     Physical Exam  Constitutional:       Appearance: Normal appearance.   HENT:      Head: Normocephalic and atraumatic.      Nose: Nose normal.      Mouth/Throat:      Mouth: Mucous membranes are moist.      Pharynx: " Oropharynx is clear.   Eyes:      Extraocular Movements: Extraocular movements intact.      Pupils: Pupils are equal, round, and reactive to light.   Pulmonary:      Effort: Pulmonary effort is normal. No respiratory distress.   Musculoskeletal:         General: No swelling or deformity. Normal range of motion.      Cervical back: Normal range of motion and neck supple.   Skin:     General: Skin is warm and dry.   Neurological:      General: No focal deficit present.      Mental Status: She is alert and oriented to person, place, and time. Mental status is at baseline.   Psychiatric:         Mood and Affect: Mood normal.         Behavior: Behavior normal.         Labs:   Brief Urine Lab Results  (Last result in the past 365 days)        Color   Clarity   Blood   Leuk Est   Nitrite   Protein   CREAT   Urine HCG        11/09/23 1220 Yellow   Clear   Negative   Negative   Negative   Negative                        Lab Results   Component Value Date    GLUCOSE 117 (H) 10/27/2023    CALCIUM 10.2 10/27/2023     10/27/2023    K 4.2 10/27/2023    CO2 23.0 10/27/2023     10/27/2023    BUN 69 (H) 10/27/2023    CREATININE 1.82 (H) 10/27/2023    EGFRIFAFRI 33 (L) 02/25/2022    EGFRIFNONA 27 (L) 02/25/2022    BCR 37.9 (H) 10/27/2023    ANIONGAP 11.0 10/27/2023       Lab Results   Component Value Date    WBC 7.57 04/23/2024    HGB 11.5 04/23/2024    HCT 35.9 04/23/2024    MCV 99 (H) 04/23/2024     04/23/2024       Images:   Mammo Screening Digital Tomosynthesis Bilateral With CAD    Result Date: 4/29/2024  FINAL IMPRESSION: ACR BI-RADS 2: Benign findings. RECOMMENDATIONS: Routine annual screening mammography. A letter including results and recommendations was sent to the patient. Density notification was provided to patients with type 3 or 4 breast tissue pattern. Patient information entered into a reminder system with a target due date for the next mammogram. At our facility, a [place] marker is positioned over  a visible skin lesion and a linear marker is used to indicate a scar. A triangular marker is placed on a self reported palpable finding. Note: Mammography does not detect approximately 10-15% of breast cancers. An annual clinical breast exam by the patient's breast care physician and regular monthly self breast exams by the patient are integral parts of breast cancer screening, in addition to annual mammography. A normal mammogram does not completely exclude the presence of breast cancer, especially if there is an abnormal finding on physical exam. When clinically indicated, a biopsy should not be deferred because of a normal mammogram report. cc: D      Measures:   Tobacco:   Karin Mchugh  reports that she quit smoking about 36 years ago. Her smoking use included cigarettes. She started smoking about 41 years ago. She has a 2.5 pack-year smoking history. She has never used smokeless tobacco.     Assessment / Plan      Assessment/Plan:   83 y.o. female who presented today for follow up of refractory OAB.  Status post full implant [employer].  Hip-Jimi and Premarin for UTI prevention.  Retrial of Gemtesa has yielded significant improvement in results, minimal pad use and minimal urgency and frequency.  She desires to continue Gemtesa as this is the only medication that has benefited her.  We will work on prior authorization or contacting the supplier to see if we can figure out a more affordable solution for the patient.  Otherwise I will see her in 6 months.  I did go down on her stimulation today because she was experiencing some at rest vaginal discomfort.    Currently on program 2, 2 lights.    She requests more samples today, we gave her 6 more weeks of Gemtesa samples from the office.    Diagnoses and all orders for this visit:    1. OAB (overactive bladder) (Primary)  -     Vibegron 75 MG tablet; Take 1 tablet by mouth Daily.  Dispense: 42 tablet; Refill: 0    2. Urgency incontinence  -     Vibegron 75 MG  tablet; Take 1 tablet by mouth Daily.  Dispense: 42 tablet; Refill: 0           Follow Up:   Return in about 6 months (around 11/2/2024).    I spent approximately 20 minutes providing clinical care for this patient; including review of patient's chart and provider documentation, face to face time spent with patient in examination room (obtaining history, performing physical exam, discussing diagnosis and management options), placing orders, and completing patient documentation.     Boone Rasheed MD  [facility] [place]

## 2024-05-03 ENCOUNTER — TELEPHONE (OUTPATIENT)
Age: 84
End: 2024-05-03
Payer: MEDICARE

## 2024-05-03 NOTE — TELEPHONE ENCOUNTER
Pt unable to afford Gemtesa. Checked with pharmacy that a PA was not needed. No PA needed, medication is $700 for a month supply.   Spoke with Agnieszka at [facility] ([phone]) and was told [facility] has a patient asst program. Program is with [employer]. A script must be sent to [facility] [address] [place] [address] and they will call pt to discuss if pt is covered.   Spoke with pt regarding this and she had stated she just moved here and her Doctor in [facility] set her up for what she thought was this program. She recently sent in the paperwork and is waiting om the determination. Pt stated she will call us if she doesn't hear back from pt asst.

## 2024-08-22 ENCOUNTER — HOSPITAL ENCOUNTER (OUTPATIENT)
Facility: HOSPITAL | Age: 84
Setting detail: OBSERVATION
Discharge: HOME | End: 2024-08-23
Attending: EMERGENCY MEDICINE | Admitting: EMERGENCY MEDICINE
Payer: MEDICARE

## 2024-08-22 DIAGNOSIS — E87.6 HYPOKALEMIA DUE TO EXCESSIVE GASTROINTESTINAL LOSS OF POTASSIUM: ICD-10-CM

## 2024-08-22 DIAGNOSIS — R19.7 DIARRHEA, UNSPECIFIED TYPE: ICD-10-CM

## 2024-08-22 DIAGNOSIS — N17.9 ACUTE RENAL FAILURE, UNSPECIFIED ACUTE RENAL FAILURE TYPE: Primary | ICD-10-CM

## 2024-08-22 LAB
ALBUMIN SERPL-MCNC: 4.3 G/DL (ref 3.5–5.2)
ALBUMIN/GLOB SERPL: 2 G/DL
ALP SERPL-CCNC: 51 U/L (ref 39–117)
ALT SERPL W P-5'-P-CCNC: 16 U/L (ref 1–33)
ANION GAP SERPL CALCULATED.3IONS-SCNC: 15.7 MMOL/L (ref 5–15)
AST SERPL-CCNC: 19 U/L (ref 1–32)
BASOPHILS # BLD AUTO: 0.03 10*3/MM3 (ref 0–0.2)
BASOPHILS NFR BLD AUTO: 0.4 % (ref 0–1.5)
BILIRUB SERPL-MCNC: 0.2 MG/DL (ref 0–1.2)
BUN SERPL-MCNC: 63 MG/DL (ref 8–23)
BUN/CREAT SERPL: 26 (ref 7–25)
CALCIUM SPEC-SCNC: 10 MG/DL (ref 8.6–10.5)
CHLORIDE SERPL-SCNC: 107 MMOL/L (ref 98–107)
CO2 SERPL-SCNC: 15.3 MMOL/L (ref 22–29)
CREAT SERPL-MCNC: 2.42 MG/DL (ref 0.57–1)
DEPRECATED RDW RBC AUTO: 46.4 FL (ref 37–54)
EGFRCR SERPLBLD CKD-EPI 2021: 19.3 ML/MIN/1.73
EOSINOPHIL # BLD AUTO: 0.12 10*3/MM3 (ref 0–0.4)
EOSINOPHIL NFR BLD AUTO: 1.5 % (ref 0.3–6.2)
ERYTHROCYTE [DISTWIDTH] IN BLOOD BY AUTOMATED COUNT: 13 % (ref 12.3–15.4)
GLOBULIN UR ELPH-MCNC: 2.2 GM/DL
GLUCOSE SERPL-MCNC: 115 MG/DL (ref 65–99)
HCT VFR BLD AUTO: 37 % (ref 34–46.6)
HGB BLD-MCNC: 12.2 G/DL (ref 12–15.9)
IMM GRANULOCYTES # BLD AUTO: 0.01 10*3/MM3 (ref 0–0.05)
IMM GRANULOCYTES NFR BLD AUTO: 0.1 % (ref 0–0.5)
LIPASE SERPL-CCNC: 45 U/L (ref 13–60)
LYMPHOCYTES # BLD AUTO: 0.6 10*3/MM3 (ref 0.7–3.1)
LYMPHOCYTES NFR BLD AUTO: 7.5 % (ref 19.6–45.3)
MAGNESIUM SERPL-MCNC: 2.2 MG/DL (ref 1.6–2.4)
MCH RBC QN AUTO: 32.1 PG (ref 26.6–33)
MCHC RBC AUTO-ENTMCNC: 33 G/DL (ref 31.5–35.7)
MCV RBC AUTO: 97.4 FL (ref 79–97)
MONOCYTES # BLD AUTO: 0.53 10*3/MM3 (ref 0.1–0.9)
MONOCYTES NFR BLD AUTO: 6.6 % (ref 5–12)
NEUTROPHILS NFR BLD AUTO: 6.68 10*3/MM3 (ref 1.7–7)
NEUTROPHILS NFR BLD AUTO: 83.9 % (ref 42.7–76)
PLATELET # BLD AUTO: 200 10*3/MM3 (ref 140–450)
PMV BLD AUTO: 9.4 FL (ref 6–12)
POTASSIUM SERPL-SCNC: 2.9 MMOL/L (ref 3.5–5.2)
PROT SERPL-MCNC: 6.5 G/DL (ref 6–8.5)
RBC # BLD AUTO: 3.8 10*6/MM3 (ref 3.77–5.28)
SODIUM SERPL-SCNC: 138 MMOL/L (ref 136–145)
WBC NRBC COR # BLD AUTO: 7.97 10*3/MM3 (ref 3.4–10.8)

## 2024-08-22 PROCEDURE — 83690 ASSAY OF LIPASE: CPT | Performed by: PHYSICIAN ASSISTANT

## 2024-08-22 PROCEDURE — 25810000003 SODIUM CHLORIDE 0.9 % SOLUTION

## 2024-08-22 PROCEDURE — G0378 HOSPITAL OBSERVATION PER HR: HCPCS

## 2024-08-22 PROCEDURE — 99285 EMERGENCY DEPT VISIT HI MDM: CPT

## 2024-08-22 PROCEDURE — 96366 THER/PROPH/DIAG IV INF ADDON: CPT

## 2024-08-22 PROCEDURE — 80053 COMPREHEN METABOLIC PANEL: CPT | Performed by: PHYSICIAN ASSISTANT

## 2024-08-22 PROCEDURE — 83735 ASSAY OF MAGNESIUM: CPT | Performed by: PHYSICIAN ASSISTANT

## 2024-08-22 PROCEDURE — 96365 THER/PROPH/DIAG IV INF INIT: CPT

## 2024-08-22 PROCEDURE — 96361 HYDRATE IV INFUSION ADD-ON: CPT

## 2024-08-22 PROCEDURE — 25010000002 POTASSIUM CHLORIDE 10 MEQ/100ML SOLUTION

## 2024-08-22 PROCEDURE — 25010000002 POTASSIUM CHLORIDE 10 MEQ/100ML SOLUTION: Performed by: PHYSICIAN ASSISTANT

## 2024-08-22 PROCEDURE — 25810000003 SODIUM CHLORIDE 0.9 % SOLUTION: Performed by: PHYSICIAN ASSISTANT

## 2024-08-22 PROCEDURE — 85025 COMPLETE CBC W/AUTO DIFF WBC: CPT | Performed by: PHYSICIAN ASSISTANT

## 2024-08-22 RX ORDER — DULOXETIN HYDROCHLORIDE 60 MG/1
60 CAPSULE, DELAYED RELEASE ORAL DAILY
COMMUNITY

## 2024-08-22 RX ORDER — SODIUM CHLORIDE 0.9 % (FLUSH) 0.9 %
10 SYRINGE (ML) INJECTION EVERY 12 HOURS SCHEDULED
Status: DISCONTINUED | OUTPATIENT
Start: 2024-08-22 | End: 2024-08-23 | Stop reason: HOSPADM

## 2024-08-22 RX ORDER — POTASSIUM CHLORIDE 1500 MG/1
40 TABLET, EXTENDED RELEASE ORAL ONCE
Status: DISCONTINUED | OUTPATIENT
Start: 2024-08-22 | End: 2024-08-23 | Stop reason: HOSPADM

## 2024-08-22 RX ORDER — SODIUM CHLORIDE 0.9 % (FLUSH) 0.9 %
10 SYRINGE (ML) INJECTION AS NEEDED
Status: DISCONTINUED | OUTPATIENT
Start: 2024-08-22 | End: 2024-08-23 | Stop reason: HOSPADM

## 2024-08-22 RX ORDER — SODIUM CHLORIDE 9 MG/ML
40 INJECTION, SOLUTION INTRAVENOUS AS NEEDED
Status: DISCONTINUED | OUTPATIENT
Start: 2024-08-22 | End: 2024-08-23 | Stop reason: HOSPADM

## 2024-08-22 RX ORDER — LEVOTHYROXINE SODIUM 75 UG/1
75 TABLET ORAL DAILY
COMMUNITY

## 2024-08-22 RX ORDER — POTASSIUM CHLORIDE 7.45 MG/ML
10 INJECTION INTRAVENOUS ONCE
Status: COMPLETED | OUTPATIENT
Start: 2024-08-22 | End: 2024-08-22

## 2024-08-22 RX ORDER — SODIUM CHLORIDE 9 MG/ML
75 INJECTION, SOLUTION INTRAVENOUS CONTINUOUS
Status: DISCONTINUED | OUTPATIENT
Start: 2024-08-22 | End: 2024-08-23 | Stop reason: HOSPADM

## 2024-08-22 RX ORDER — POTASSIUM CHLORIDE 7.45 MG/ML
10 INJECTION INTRAVENOUS
Status: DISCONTINUED | OUTPATIENT
Start: 2024-08-22 | End: 2024-08-22

## 2024-08-22 RX ORDER — POTASSIUM CHLORIDE 750 MG/1
40 CAPSULE, EXTENDED RELEASE ORAL ONCE
Status: COMPLETED | OUTPATIENT
Start: 2024-08-23 | End: 2024-08-22

## 2024-08-22 RX ORDER — LOPERAMIDE HCL 2 MG
2 CAPSULE ORAL ONCE
Status: COMPLETED | OUTPATIENT
Start: 2024-08-22 | End: 2024-08-22

## 2024-08-22 RX ORDER — POTASSIUM CHLORIDE 750 MG/1
40 CAPSULE, EXTENDED RELEASE ORAL ONCE
Status: COMPLETED | OUTPATIENT
Start: 2024-08-22 | End: 2024-08-22

## 2024-08-22 RX ORDER — ALBUTEROL SULFATE 90 UG/1
2 AEROSOL, METERED RESPIRATORY (INHALATION) EVERY 4 HOURS PRN
COMMUNITY

## 2024-08-22 RX ORDER — ACETAMINOPHEN 325 MG/1
650 TABLET ORAL EVERY 4 HOURS PRN
Status: DISCONTINUED | OUTPATIENT
Start: 2024-08-22 | End: 2024-08-23 | Stop reason: HOSPADM

## 2024-08-22 RX ADMIN — LOPERAMIDE HYDROCHLORIDE 2 MG: 2 CAPSULE ORAL at 18:48

## 2024-08-22 RX ADMIN — POTASSIUM CHLORIDE 10 MEQ: 7.46 INJECTION, SOLUTION INTRAVENOUS at 21:24

## 2024-08-22 RX ADMIN — Medication 10 ML: at 20:49

## 2024-08-22 RX ADMIN — POTASSIUM CHLORIDE 40 MEQ: 750 CAPSULE, EXTENDED RELEASE ORAL at 23:32

## 2024-08-22 RX ADMIN — POTASSIUM CHLORIDE 10 MEQ: 7.46 INJECTION, SOLUTION INTRAVENOUS at 18:53

## 2024-08-22 RX ADMIN — SODIUM CHLORIDE 1000 ML: 9 INJECTION, SOLUTION INTRAVENOUS at 17:26

## 2024-08-22 RX ADMIN — POTASSIUM CHLORIDE 40 MEQ: 750 CAPSULE, EXTENDED RELEASE ORAL at 18:48

## 2024-08-22 RX ADMIN — SODIUM CHLORIDE 75 ML/HR: 9 INJECTION, SOLUTION INTRAVENOUS at 20:49

## 2024-08-23 VITALS
HEART RATE: 90 BPM | OXYGEN SATURATION: 95 % | DIASTOLIC BLOOD PRESSURE: 53 MMHG | RESPIRATION RATE: 18 BRPM | WEIGHT: 137.4 LBS | HEIGHT: 60 IN | TEMPERATURE: 97.6 F | BODY MASS INDEX: 26.97 KG/M2 | SYSTOLIC BLOOD PRESSURE: 141 MMHG

## 2024-08-23 PROBLEM — E87.6 HYPOKALEMIA: Status: ACTIVE | Noted: 2024-08-23

## 2024-08-23 PROBLEM — E87.6 HYPOKALEMIA: Status: RESOLVED | Noted: 2024-08-23 | Resolved: 2024-08-23

## 2024-08-23 LAB
ALBUMIN SERPL-MCNC: 3.6 G/DL (ref 3.5–5.2)
ALBUMIN SERPL-MCNC: 4 G/DL (ref 3.5–5.2)
ALBUMIN/GLOB SERPL: 1.8 G/DL
ALBUMIN/GLOB SERPL: 1.9 G/DL
ALP SERPL-CCNC: 45 U/L (ref 39–117)
ALP SERPL-CCNC: 50 U/L (ref 39–117)
ALT SERPL W P-5'-P-CCNC: 13 U/L (ref 1–33)
ALT SERPL W P-5'-P-CCNC: 15 U/L (ref 1–33)
ANION GAP SERPL CALCULATED.3IONS-SCNC: 14 MMOL/L (ref 5–15)
ANION GAP SERPL CALCULATED.3IONS-SCNC: 17 MMOL/L (ref 5–15)
AST SERPL-CCNC: 20 U/L (ref 1–32)
AST SERPL-CCNC: 20 U/L (ref 1–32)
BASOPHILS # BLD AUTO: 0.04 10*3/MM3 (ref 0–0.2)
BASOPHILS NFR BLD AUTO: 0.6 % (ref 0–1.5)
BILIRUB SERPL-MCNC: 0.2 MG/DL (ref 0–1.2)
BILIRUB SERPL-MCNC: 0.2 MG/DL (ref 0–1.2)
BILIRUB UR QL STRIP: NEGATIVE
BUN SERPL-MCNC: 51 MG/DL (ref 8–23)
BUN SERPL-MCNC: 54 MG/DL (ref 8–23)
BUN/CREAT SERPL: 27.1 (ref 7–25)
BUN/CREAT SERPL: 27.4 (ref 7–25)
CALCIUM SPEC-SCNC: 8.7 MG/DL (ref 8.6–10.5)
CALCIUM SPEC-SCNC: 9.2 MG/DL (ref 8.6–10.5)
CHLORIDE SERPL-SCNC: 111 MMOL/L (ref 98–107)
CHLORIDE SERPL-SCNC: 116 MMOL/L (ref 98–107)
CLARITY UR: CLEAR
CO2 SERPL-SCNC: 12 MMOL/L (ref 22–29)
CO2 SERPL-SCNC: 14 MMOL/L (ref 22–29)
COLOR UR: YELLOW
CREAT SERPL-MCNC: 1.86 MG/DL (ref 0.57–1)
CREAT SERPL-MCNC: 1.99 MG/DL (ref 0.57–1)
DEPRECATED RDW RBC AUTO: 48.5 FL (ref 37–54)
EGFRCR SERPLBLD CKD-EPI 2021: 24.4 ML/MIN/1.73
EGFRCR SERPLBLD CKD-EPI 2021: 26.4 ML/MIN/1.73
EOSINOPHIL # BLD AUTO: 0.19 10*3/MM3 (ref 0–0.4)
EOSINOPHIL NFR BLD AUTO: 3 % (ref 0.3–6.2)
ERYTHROCYTE [DISTWIDTH] IN BLOOD BY AUTOMATED COUNT: 13.2 % (ref 12.3–15.4)
GLOBULIN UR ELPH-MCNC: 1.9 GM/DL
GLOBULIN UR ELPH-MCNC: 2.2 GM/DL
GLUCOSE SERPL-MCNC: 79 MG/DL (ref 65–99)
GLUCOSE SERPL-MCNC: 84 MG/DL (ref 65–99)
GLUCOSE UR STRIP-MCNC: NEGATIVE MG/DL
HCT VFR BLD AUTO: 33.1 % (ref 34–46.6)
HGB BLD-MCNC: 11 G/DL (ref 12–15.9)
HGB UR QL STRIP.AUTO: NEGATIVE
IMM GRANULOCYTES # BLD AUTO: 0.01 10*3/MM3 (ref 0–0.05)
IMM GRANULOCYTES NFR BLD AUTO: 0.2 % (ref 0–0.5)
KETONES UR QL STRIP: NEGATIVE
LEUKOCYTE ESTERASE UR QL STRIP.AUTO: NEGATIVE
LYMPHOCYTES # BLD AUTO: 0.95 10*3/MM3 (ref 0.7–3.1)
LYMPHOCYTES NFR BLD AUTO: 15.2 % (ref 19.6–45.3)
MAGNESIUM SERPL-MCNC: 2.1 MG/DL (ref 1.6–2.4)
MCH RBC QN AUTO: 32.8 PG (ref 26.6–33)
MCHC RBC AUTO-ENTMCNC: 33.2 G/DL (ref 31.5–35.7)
MCV RBC AUTO: 98.8 FL (ref 79–97)
MONOCYTES # BLD AUTO: 0.58 10*3/MM3 (ref 0.1–0.9)
MONOCYTES NFR BLD AUTO: 9.3 % (ref 5–12)
NEUTROPHILS NFR BLD AUTO: 4.48 10*3/MM3 (ref 1.7–7)
NEUTROPHILS NFR BLD AUTO: 71.7 % (ref 42.7–76)
NITRITE UR QL STRIP: NEGATIVE
PH UR STRIP.AUTO: 6 [PH] (ref 5–8)
PLATELET # BLD AUTO: 180 10*3/MM3 (ref 140–450)
PMV BLD AUTO: 9.2 FL (ref 6–12)
POTASSIUM SERPL-SCNC: 4 MMOL/L (ref 3.5–5.2)
POTASSIUM SERPL-SCNC: 4.2 MMOL/L (ref 3.5–5.2)
PROT SERPL-MCNC: 5.5 G/DL (ref 6–8.5)
PROT SERPL-MCNC: 6.2 G/DL (ref 6–8.5)
PROT UR QL STRIP: NEGATIVE
RBC # BLD AUTO: 3.35 10*6/MM3 (ref 3.77–5.28)
SODIUM SERPL-SCNC: 142 MMOL/L (ref 136–145)
SODIUM SERPL-SCNC: 142 MMOL/L (ref 136–145)
SP GR UR STRIP: 1.01 (ref 1–1.03)
UROBILINOGEN UR QL STRIP: NORMAL
WBC NRBC COR # BLD AUTO: 6.25 10*3/MM3 (ref 3.4–10.8)

## 2024-08-23 PROCEDURE — 81003 URINALYSIS AUTO W/O SCOPE: CPT | Performed by: EMERGENCY MEDICINE

## 2024-08-23 PROCEDURE — 85025 COMPLETE CBC W/AUTO DIFF WBC: CPT

## 2024-08-23 PROCEDURE — G0378 HOSPITAL OBSERVATION PER HR: HCPCS

## 2024-08-23 PROCEDURE — 83735 ASSAY OF MAGNESIUM: CPT

## 2024-08-23 PROCEDURE — 80053 COMPREHEN METABOLIC PANEL: CPT

## 2024-08-23 RX ORDER — POTASSIUM CHLORIDE 1500 MG/1
20 TABLET, EXTENDED RELEASE ORAL 2 TIMES DAILY
Qty: 10 TABLET | Refills: 0 | Status: SHIPPED | OUTPATIENT
Start: 2024-08-23 | End: 2024-08-28

## 2024-08-23 RX ORDER — POTASSIUM CHLORIDE 750 MG/1
40 CAPSULE, EXTENDED RELEASE ORAL ONCE
Status: DISCONTINUED | OUTPATIENT
Start: 2024-08-23 | End: 2024-08-23 | Stop reason: HOSPADM

## 2024-08-23 RX ORDER — POTASSIUM CHLORIDE 1500 MG/1
40 TABLET, EXTENDED RELEASE ORAL DAILY
Qty: 10 TABLET | Refills: 0 | OUTPATIENT
Start: 2024-08-23 | End: 2024-08-28

## 2024-08-23 RX ADMIN — APIXABAN 2.5 MG: 5 TABLET, FILM COATED ORAL at 10:31

## 2024-08-23 RX ADMIN — ACETAMINOPHEN 650 MG: 325 TABLET ORAL at 01:55

## 2024-08-23 NOTE — PLAN OF CARE
Goal Outcome Evaluation:      Fluids given. Creat 1.86 from 2.42. urine WNL. Meets criteria for discharge

## 2024-08-23 NOTE — DISCHARGE SUMMARY
[facility]     Discharge Note    Patient Name: Karin Mchugh  : 1940  MRN: [id number]  Primary Care Physician:  Skye Loya MD    Date of admission: 2024  Date of Discharge:  2024    Chief Complaint: Generalized weakness/diarrhea    Diarrhea     Weakness - Generalized    Patient is a 83 y.o. female with PMHx of of CKD who presented to the ED yesterday with complaints of generalized weakness and diarrhea over the past few days.  Has had multiple C. difficile stool studies over the past few weeks that have come back negative.  It was noted the patient had a creatinine of 2.42 upon arrival to the ED, as well as a potassium of 2.9.  Patient stated that she has been taking her Lasix daily and has not been eating/drinking much.  Patient was given liter of IV fluids and loperamide, as well as oral and IV potassium.  It was deemed by provider in the ED the patient be admitted to the CDU for further IV fluids and potassium replacement.    Review of Systems   Constitutional:  Positive for appetite change.   Gastrointestinal:  Positive for abdominal distention and diarrhea.   All other systems reviewed and are negative.    Objective     Vital Signs  Temp:  [97.6 °F (36.4 °C)-97.8 °F (36.6 °C)] 97.6 °F (36.4 °C)  Heart Rate:  [78-95] 90  Resp:  [16-18] 18  BP: ()/(52-81) 141/53    Physical Exam  Constitutional:       General: She is not in acute distress.     Appearance: Normal appearance. She is not ill-appearing.   HENT:      Head: Normocephalic and atraumatic.   Cardiovascular:      Rate and Rhythm: Normal rate and regular rhythm.      Pulses: Normal pulses.   Pulmonary:      Effort: Pulmonary effort is normal. No respiratory distress.      Breath sounds: Normal breath sounds.   Abdominal:      General: There is distension (mild).      Tenderness: There is no abdominal tenderness.   Musculoskeletal:         General: No swelling or tenderness. Normal range of motion.      Cervical back:  Normal range of motion and neck supple.   Skin:     General: Skin is warm and dry.   Neurological:      General: No focal deficit present.      Mental Status: She is alert and oriented to person, place, and time.   Psychiatric:         Mood and Affect: Mood normal.         Behavior: Behavior normal.       Discharge Diagnosis:   ADEOLA  Hypokalemia    Condition on Discharge: Stable, improved    Hospital Course  Patient is a 83 y.o. female who presented to the ED yesterday with complaints of generalized weakness and diarrhea over the past few days.  Patient was found to have a creatinine of 2.42 and potassium of 2.9 upon arrival to the ED.  Was given IV fluids, loperamide, and IV and oral potassium while in the ED and was admitted to the CDU for further fluid and potassium replacement.  Patient had uneventful night.  Was given 2 doses of 40 mEq oral potassium and 3 rounds of 10 mEq IV potassium.  Was also given liter of IV fluids and started on maintenance fluids at 75 mL/hr. BMP was drawn at 1 AM which showed improvement in both creatinine and potassium.  Creatinine had continued to decrease on 6 AM labs to 1.86, and potassium has increased to 4.0.  Review of previous labs demonstrate that creatinine usually ranges from 1.6 - 1.9 since 1/2023, most likely from history of chronic kidney disease for which she has been closely followed.  At that time, potassium protocol was discontinued.  Labs were drawn again at 6 AM which showed a potassium of 4 and a creatinine of 1.86.  Patient stated that she felt better and was ready to be discharged.  Advised patient to follow-up with PCP for further evaluation and to return to ED if symptoms worsened.  Will give patient 5 day course of 40 mEq potassium.  Patient was agreeable to plan and discharge.    Discharge Medications     Discharge Medications        New Medications        Instructions Start Date   potassium chloride 20 MEQ CR tablet  Commonly known as: KLOR-CON M20   20 mEq,  Oral, 2 Times Daily             Changes to Medications        Instructions Start Date   levothyroxine 75 MCG tablet  Commonly known as: SYNTHROID, LEVOTHROID  What changed: Another medication with the same name was removed. Continue taking this medication, and follow the directions you see here.   75 mcg, Oral, Daily             Continue These Medications        Instructions Start Date   albuterol sulfate  (90 Base) MCG/ACT inhaler  Commonly known as: PROVENTIL HFA;VENTOLIN HFA;PROAIR HFA   2 puffs, Inhalation, Every 4 Hours PRN      allopurinol 100 MG tablet  Commonly known as: ZYLOPRIM   Take 1 tablet by mouth Daily.      budesonide-formoterol 80-4.5 MCG/ACT inhaler  Commonly known as: SYMBICORT   2 puffs, Inhalation, 2 Times Daily - RT      Calcium Citrate-Vitamin D3 315-6.25 MG-MCG tablet tablet  Commonly known as: CITRACAL   Take 1 tablet by mouth Daily.      DULoxetine 60 MG capsule  Commonly known as: CYMBALTA   60 mg, Oral, Daily      Eliquis 2.5 MG tablet tablet  Generic drug: apixaban   Take 1 tablet by mouth Every 12 (Twelve) Hours.      famotidine 20 MG tablet  Commonly known as: PEPCID   Take 1 tablet by mouth 2 (Two) Times a Day.      FLUoxetine 20 MG capsule  Commonly known as: PROzac   Take 1 capsule by mouth Daily.      furosemide 20 MG tablet  Commonly known as: LASIX   20 mg, Oral, Daily      Hiprex 1 g tablet  Generic drug: methenamine   Take 1 tablet by mouth 2 (Two) Times a Day With Meals.      isosorbide mononitrate 60 MG 24 hr tablet  Commonly known as: IMDUR   Take 1 tablet by mouth Daily.      montelukast 10 MG tablet  Commonly known as: SINGULAIR   Take 1 tablet by mouth Every Night.      nitroglycerin 0.4 MG SL tablet  Commonly known as: NITROSTAT   Place 1 tablet under the tongue Every 5 (Five) Minutes As Needed for Chest Pain.      Premarin 0.625 MG/GM vaginal cream  Generic drug: Estrogens Conjugated   Insert  into the vagina 3 (Three) Times a Week. Monday, Wednesday, Friday       ramipril 5 MG capsule  Commonly known as: ALTACE   5 mg, Oral, Daily      rosuvastatin 5 MG tablet  Commonly known as: CRESTOR   Take 1 tablet by mouth Daily.      tiZANidine 2 MG tablet  Commonly known as: ZANAFLEX   Take 1 tablet by mouth Every 6 (Six) Hours As Needed for Muscle Spasms.      traMADol 50 MG tablet  Commonly known as: Ultram   50 mg, Oral, Every 12 Hours PRN      Vibegron 75 MG tablet   75 mg, Oral, Daily               Follow-up Appointments  Future Appointments   Date Time Provider Department Center   11/6/2024 11:20 AM Boone Rasheed MD [facility]       Total time in CDU: 12 hours  I spent 30 minutes with patient discussing patient plan of care, discharge instructions, and follow-up appointments with PCP.    Carlos Martinez PA-C

## 2024-09-28 ENCOUNTER — DOCUMENTATION (OUTPATIENT)
Dept: INTERNAL MEDICINE | Facility: HOSPITAL | Age: 84
End: 2024-09-28
Payer: MEDICARE

## 2024-11-06 ENCOUNTER — TELEPHONE (OUTPATIENT)
Dept: UROLOGY | Facility: CLINIC | Age: 84
End: 2024-11-06

## 2024-11-06 NOTE — TELEPHONE ENCOUNTER
Provider: DR. CASTRO    Caller: Karin Mchugh    Relationship to Patient: Self     Phone Number: [phone]    Reason for Call: CANCEL APPOINTMENT DUE TO BEING SICK    Did the patient call AFTER the start time of their scheduled appointment?  NO    When was the patient last seen: 05/02/24    Notes: 11/06/24 @ 11:20 AM APPOINTMENT IS CANCELED.

## (undated) DEVICE — ANTIBACTERIAL UNDYED BRAIDED (POLYGLACTIN 910), SYNTHETIC ABSORBABLE SUTURE: Brand: COATED VICRYL

## (undated) DEVICE — GLV SURG BIOGEL LTX PF 8

## (undated) DEVICE — TBG PENCL TELESCP MEGADYNE SMOKE EVAC 10FT

## (undated) DEVICE — APPL CHLORAPREP TINTED 26ML TEAL

## (undated) DEVICE — C-ARM: Brand: UNBRANDED

## (undated) DEVICE — HDRST INTUB GENTLETOUCH SLOT 7IN RT

## (undated) DEVICE — TP SILK DURAPORE 3IN

## (undated) DEVICE — LEAD IMPLANT KIT: Brand: AXONICS

## (undated) DEVICE — ELECTRD BLD EZ CLN MOD XLNG 2.75IN

## (undated) DEVICE — SUT SILK 2/0 TIES 18IN A185H

## (undated) DEVICE — GOWN,NON-REINFORCED,SIRUS,SET IN SLV,XXL: Brand: MEDLINE

## (undated) DEVICE — INTENDED USE FOR SURGICAL MARKING ON INTACT SKIN, ALSO PROVIDES A PERMANENT METHOD OF IDENTIFYING OBJECTS IN THE OPERATING ROOM: Brand: WRITESITE® REGULAR TIP SKIN MARKER

## (undated) DEVICE — POSTN ARM CRDL LAMIN PK/2

## (undated) DEVICE — PK MINOR SPLT 10

## (undated) DEVICE — ADHS SKIN PREMIERPRO EXOFIN TOPICAL HI/VISC .5ML

## (undated) DEVICE — Device: Brand: JELCO

## (undated) DEVICE — REMOTE CONTROL: Brand: AXONICS

## (undated) DEVICE — STERILE PVP: Brand: MEDLINE INDUSTRIES, INC.

## (undated) DEVICE — INTENDED FOR TISSUE SEPARATION, AND OTHER PROCEDURES THAT REQUIRE A SHARP SURGICAL BLADE TO PUNCTURE OR CUT.: Brand: BARD-PARKER ® STAINLESS STEEL BLADES

## (undated) DEVICE — TRAP FLD MINIVAC MEGADYNE 100ML

## (undated) DEVICE — SUT MNCRYL PLS ANTIB UD 4/0 PS2 18IN

## (undated) DEVICE — HDRST POSTIN FM CRDL TRACH SLOT NONCOMRESS 9X8X4IN

## (undated) DEVICE — SPNG GZ WOVN 4X4IN 12PLY 10/BX STRL